# Patient Record
(demographics unavailable — no encounter records)

---

## 2024-11-23 NOTE — P.HP
Certification for Inpatient


Patient admitted to: Observation


With expected LOS: <2 Midnights


Patient will require the following post-hospital care: None


Practitioner: I am a practitioner with admitting privileges, knowledge of 

patient current condition, hospital course, and medical plan of care.


Services: Services provided to patient in accordance with Admission requirements

found in Title 42 Section 412.3 of the Code of Federal Regulations





Patient History


Date of Service: 11/24/24


Primary Care Provider: sees Dr. Schrader, Dr. Martinez, Dr. Maynard


History of Present Illness: 





Ms. Martinez is a 78-year-old female with a past medical history of 

hypertension, hyperlipidemia, A-fib on long-term anticoagulation (Eliquis), 

COPD, assuming CKI with KINGA superimposed, hypothyroidism, dementia, 

osteoporosis, and chronic pain.  She presented to the emergency department for 

wheezing and was noted to have acute anemia.  Ms. Martinez has not been seen at 

this facility in about 3 years or so unsure of her kidney function/echo/normal 

H&H.  She gives a history of seeing Dr. Martinez, Dr. Maynard, and Dr. Schrader.  

At bedside she is alert but confused, pale, with multiple areas to her 

extremities with pressure bandages from bleeding bright red blood.  Her  

states that she scratches herself during the evening and he is attempted to 

cover her skin but she awakes with the sheets wet with blood.  She denies 

hematuria, melena, hematochezia, any nausea or vomiting, or hemoptysis.  Vital 

signs at this time are 198/62, 98% on 2 L via nasal cannula, heart rate 51. 


Her medications are consistent with hypertension, A-fib, congestive failure, 

CKD, COPD, dementia, and hypothyroidism.  They have typed and screened and 

ordered 2 units of packed red blood cells in the emergency department. 


Allergies





Sulfa (Sulfonamide Antibiotics) [Sulfa(Sulfonamide Antibiotics)] Allergy 

(Intermediate, Verified 09/21/20 15:05)


   unknown


artificial sweetners Allergy (Uncoded 09/21/20 15:05)


   Anaphylaxis





Home medications list reviewed: Yes


Home Medications: 








Amiodarone HCl [Cordarone*] 200 mg PO BID 04/08/18 


Atorvastatin Calcium [Lipitor] 40 mg PO BEDTIME 04/08/18 


Metoprolol Succinate 25 mg PO BID 09/21/20 


Apixaban [Eliquis] 5 mg PO TID 11/23/24 


Furosemide 40 mg PO DAILY 11/23/24 


Hydralazine HCl 25 mg PO TID 11/23/24 


Levothyroxine Sodium [Unithroid] 37.5 mcg PO DAILY 11/23/24 


Memantine HCl 5 mg PO BID 11/23/24 


Metoprolol Tartrate [Lopressor] 50 mg PO BID 11/23/24 


Potassium Chloride [Klor-Con 10] 10 meq PO DAILY 11/23/24 


Triamterene/Hydrochlorothiazid [Triamterene-Hctz 37.5-25 mg Cp] 1 each PO DAILY 

11/23/24 








- Past Medical/Surgical History


Has patient received pneumonia vaccine in the past: Yes


Diabetic: No


-: SVT


-: chronic pain syndrome


-: dyslipidemia


-: hypothyroidism


-: A-fib


-: Heart failure with unknown EF


-: Dementia


-: Osteoporosis


-: bupivicain pain pump


Psychosocial/ Personal History: Lives at home with her .  Has dementia.  

Denies having home O2.





- Family History


  ** Father


-: Heart disease





  ** Mother


-: Heart disease





  ** Sister


-: Cancer





- Social History


Smoking Status: Former smoker


Alcohol use: No


CD- Drugs: No


Caffeine use: No


Place of Residence: Home





Review of Systems


10-point ROS is otherwise unremarkable


General: Weakness


Eyes: Unremarkable


ENT: Unremarkable


Respiratory: Wheezing


Cardiovascular: Light Headedness


Gastrointestinal: Unremarkable


Genitourinary: Unremarkable


Musculoskeletal: Unremarkable


Integumentary: Unremarkable


Neurological: Other (Lightheaded)


Lymphatics: Unremarkable





Physical Examination





- Physical Exam


General: Alert, Cooperative, Confused, Obese


HEENT: Atraumatic, Normocephalic


Neck: Supple


Respiratory: Crackles/rales, Expiratory wheezes


Cardiovascular: Other (Bradycardic at 51 per telemetry monitor in the room, 

unable to locate EKG from the emergency department)


Capillary refill: <2 Seconds


Gastrointestinal: Normal bowel sounds, Soft and benign


Musculoskeletal: No clubbing, No swelling


Integumentary: Other (Thin skin with multiple areas of pressure bandages to 

extremities)


Neurological: Normal tone, Normal affect, Dementia


Lymphatics: No axilla or inguinal lymphadenopathy


External genitalia: Deferred


Rectal: Deferred





- Studies


Laboratory Data (last 24 hrs)











  11/23/24 11/23/24 11/23/24





  12:00 12:00 12:00


 


WBC    7.60


 


Hgb    7.3 L


 


Hct    24.5 L


 


Plt Count    394


 


PT  28.6 H  


 


INR  2.63  


 


APTT  36.8  


 


Sodium   136 


 


Potassium   4.5 


 


BUN   39 H 


 


Creatinine   2.06 H 


 


Glucose   139 H 


 


Total Bilirubin   1.0 


 


AST   20 


 


ALT   15 


 


Alkaline Phosphatase   98 











Assessment and Plan





- Plan


HF with unknown EF


Continue home medication except at triamterene/HCTZ


Telemetry


Echocardiogram


ACE inhibitor/ARB


Beta-blocker


Cardiology consultation


Diuresis


Strict Is & Os


Repeat chest x-ray


Daily weight


Education regarding diet and treatment of congestive heart failure





History of A-fib with SVR, long-term anticoagulant use, supratherapeutic INR


Hold Eliquis for now, consider TXA, Kcentra, will be rec'ing 2 units PRBCs from 

ED


Monitor for bleeding urine, stool, vaginal, hemoptysis, peripheral


CKD with KINGA, unknown recent creatinine/GFR


Monitor and trend creatinine and GFR


Strict I&O


Acute on chronic anemia, multifactorial including current acute loss and anemia 

of chronic disease secondary to CKD


Consult Dr. Martinez





Hypothyroidism 


Continue current euthyroid





Dementia


Continue memantine


Reorient frequently


Safety measures/ fall precaution





GI prophylaxis


Plan to discharge in: Greater than 2 days





- Advance Directives


Does patient have a Living Will: No


Does patient have a Durable POA for Healthcare: No





- Code Status/Comfort Care


Code Status Assessed: Yes (Full)

## 2024-11-23 NOTE — EDPHYS
Physician Documentation                                                                           

 [facility]                                                                 

Name: Olga Martinez                                                                              

Age: 78 yrs                                                                                       

Sex: Female                                                                                       

: 1946                                                                                   

MRN: [id number]                                                                                   

Arrival Date: 2024                                                                          

Time: 11:18                                                                                       

Account#: [id number]                                                                            

Bed 17                                                                                            

Private MD:                                                                                       

ED Physician Azam Llamas                                                                        

HPI:                                                                                              

                                                                                             

16:06 This 78 yrs old  Female presents to ER via Wheelchair with complaints of High  dr5 

      Blood Pressure.                                                                             

16:06 The patient has elevated blood pressure and discovered this at home. Onset: The         dr5 

      symptoms/episode began/occurred acutely. .                                                  

16:10 Patient is a 78-year-old female with history of chronic pain, hypothyroidism, COPD,     dr5 

      hyperlipidemia, cardiac arrhythmias presenting with right forearm bleeding that will        

      not stop. Patient reports taking Eliquis 5 mg twice a day. Patient went to next of her      

      urgent care and they were unable to stop bleeding and was referred here. Patient denies     

      chest pain shortness of breath. Patient denies any kidney issues..                          

                                                                                                  

Historical:                                                                                       

- Allergies:                                                                                      

11:43 PENICILLINS;                                                                            cm10

11:43 Sulfa (Sulfonamide Antibiotics);                                                        cm10

- Home Meds:                                                                                      

11:43 Eliquis 5 mg Oral tab 2 times per day [Active];                                         cm10

- PMHx:                                                                                           

11:43 cardiiac arrythmia; Chronic pain; Dementia; High Cholesterol; Hypothyroidism; COPD;     cm10

                                                                                                  

- Immunization history:: Adult Immunizations up to date.                                          

- Infectious Disease History:: Denies.                                                            

- Social history:: Smoking status: Patient/guardian denies using tobacco.                         

                                                                                                  

                                                                                                  

ROS:                                                                                              

16:10 Constitutional: as per hpi                                                              dr5 

                                                                                                  

Exam:                                                                                             

16:10 Constitutional:  This is a well developed, well nourished patient who is awake, alert,  dr5 

      and in no acute distress. Head/Face:  Normocephalic, atraumatic. Eyes:  Pupils equal        

      round and reactive to light, extra-ocular motions intact.  Lids and lashes normal.          

      Conjunctiva and sclera are non-icteric and not injected - pale.  Cornea within normal       

      limits.  Periorbital areas with no swelling, redness, or edema. Chest/axilla:  Normal       

      chest wall appearance and motion.  Nontender with no deformity.  No lesions are             

      appreciated. Cardiovascular:  Regular rate and rhythm with a normal S1 and S2. Normal       

      PMI, no JVD. No pulse deficits. Respiratory:  Lungs have equal breath sounds                

      bilaterally, clear to auscultation.  No rales, rhonchi or wheezes noted. No increased       

      work of breathing, no retractions or nasal flaring.                                         

16:10 Skin: injury, abrasion(s), moderate sized abrasion noted, of the dorsal aspect of right     

      forearm, Skin abrasion noted to right forearm.  Bleeding controlled with nonadherent        

      dressing and pressure dressing.,                                                            

16:10 Neuro: Exam negative for acute changes,                                                     

                                                                                                  

Vital Signs:                                                                                      

11:42  / 62; Pulse 64; Resp 18; Temp 98.1(O); Pulse Ox 86% on R/A; Weight 113.4 kg;     cm10

      Height 5 ft. 7 in. ; Pain 5/10;                                                             

12:57  / 63; Pulse 55; Resp 17; Pulse Ox 99% on R/A;                                    rs5 

13:44  / 64; Pulse 53; Resp 16; Pulse Ox 99% on R/A;                                    rs5 

14:58  / 63; Pulse 51; Resp 17; Pulse Ox 99% on R/A;                                    rs5 

15:45  / 66; Pulse 54; Resp 17; Pulse Ox 99% on R/A;                                    rs5 

11:42 Body Mass Index 39.16 (113.40 kg, 170.18 cm)                                            cm10

11:42 Pain Scale: Adult                                                                       cm10

                                                                                                  

MDM:                                                                                              

11:24 Medical Screening Exam initiated                                                        dr5 

16:10 Differential diagnosis: hypertensive crisis. Data reviewed: vital signs, nurses notes,  dr5 

      lab test result(s). Consideration of Admission/Observation Patient was admitted/placed      

      on observation. Historians other than the Patient: Spouse/Significant Other: .       

      Care significantly affected by the following chronic conditions: Diabetes,                  

      Hypertension, Hyperlipidemia. Care significantly affected by the following Social           

      Determinants of Health: Poor access to healthcare and/or lack of insurance, Poor access     

      to transportation. Counseling: I had a detailed discussion with the patient and/or          

      guardian regarding the historical points, exam findings, and any diagnostic results         

      supporting the discharge/admit diagnosis, the presence of at least one elevated blood       

      pressure reading (>120/80) during this emergency department visit, the need for further     

      work-up and treatment in the hospital. ED course: I gave patient blood pressure             

      medications upon her arrival due to dizziness. Labs show acute kidney injury, anemia.       

      Will admit to hospital for blood transfusion and further management.. ED course: 2          

      units of PRBC ordered with Type and Screen. Consent obtained.                               

                                                                                                  

                                                                                             

11:43 Order name: CBC with Diff                                                               dr5 

                                                                                             

11:43 Order name: CMP; Complete Time: 12:29                                                   dr5 

                                                                                             

11:43 Order name: Protime (+inr); Complete Time: 12:22                                        dr5 

                                                                                             

11:43 Order name: Ptt, Activated; Complete Time: 12:22                                        dr5 

                                                                                             

13:35 Order name: Type And Screen                                                             dr5 

                                                                                             

14:53 Order name: UR SODIUM                                                                   EDMS

                                                                                             

14:53 Order name: Urinalysis w/ reflexes                                                      EDMS

                                                                                             

14:53 Order name: Basic Metabolic Panel                                                       EDMS

                                                                                             

14:53 Order name: Basic Metabolic Panel                                                       EDMS

                                                                                             

14:53 Order name: CBC with Automated Diff                                                     EDMS

                                                                                             

14:53 Order name: CBC with Automated Diff                                                     EDMS

                                                                                             

14:53 Order name: Ferritin                                                                    EDMS

                                                                                             

14:53 Order name: Ferritin                                                                    EDMS

                                                                                             

14:53 Order name: Iron                                                                        EDMS

                                                                                             

14:53 Order name: Iron                                                                        EDMS

                                                                                             

14:53 Order name: Protime (+INR)                                                              EDMS

                                                                                             

14:53 Order name: Protime (+INR)                                                              EDMS

                                                                                             

14:53 Order name: PTT, Activated Partial Thromb                                               EDMS

                                                                                             

14:53 Order name: PTT, Activated Partial Thromb                                               EDMS

                                                                                             

14:53 Order name: Transferrin Sat/Iron Binding                                                EDMS

                                                                                             

14:53 Order name: Transferrin Sat/Iron Binding                                                EDMS

                                                                                             

14:56 Order name: CBC Smear Scan                                                              EDMS

                                                                                             

15:41 Order name: ABO/RH no charge; Complete Time: 16:13                                      EDMS

                                                                                                  

Administered Medications:                                                                         

No medications were administered                                                                  

                                                                                                  

                                                                                                  

Disposition Summary:                                                                              

24 14:31                                                                                    

Hospitalization Ordered                                                                           

 Notes:       Hospitalization Status: Inpatient Admission                                           
  dr5

      Provider: Parish Palafox                                                                 dr5 

      Location: Telemetry/MedSurg (Inpatient)                                                 dr5 

      Condition: Stable                                                                       dr5 

      Problem: new                                                                            dr5 

      Symptoms: are unchanged                                                                 dr5 

      Bed/Room Type: Standard                                                                 dr5 

      Room Assignment: 202(24 14:59)                                                    cc6 

      Diagnosis                                                                                   

        - Anemia, unspecified                                                                 dr5 

        - Acute kidney failure, unspecified                                                   dr5 

      Forms:                                                                                      

        - Medication Reconciliation Form                                                      dr5 

        - SBAR form                                                                           dr5 

        - Leadership Thank You Letter                                                         dr5 

Signatures:                                                                                       

Dispatcher [employer]                           Maxine Ambrosioissa, RN                  RN   cm10                                                 

Gayle Cullen                           cc6                                                  

Ulises Hutchins, FNP-C                   FNP-Cdr5                                                  

                                                                                                  

Corrections: (The following items were deleted from the chart)                                    

11 11:44 CBC+H.LAB.BRZ ordered. EDMS                                                       EDMS

 11:44 COMPREHENSIVE METABOLIC PANEL+C.LAB.BRZ ordered. EDMS                             EDMS

: 11:44 PROTIME (+INR)+COAG.LAB.BRZ ordered. EDMS                                         EDMS

 11:44 PTT, ACTIVATED+COAG.LAB.BRZ ordered. EDMS                                         EDMS

13:39 13:39 BB Add On+BB.LAB.BRZ ordered. EDMS                                                EDMS

14:59 14:31 dr5                                                                               cc6 

                                                                                                  

**************************************************************************************************

## 2024-11-23 NOTE — ER
Nurse's Notes                                                                                     

 [facility]                                                                 

Name: Olga Martinez                                                                              

Age: 78 yrs                                                                                       

Sex: Female                                                                                       

: 1946                                                                                   

MRN: [id number]                                                                                   

Arrival Date: 2024                                                                          

Time: 11:18                                                                                       

Account#: [id number]                                                                            

Bed 17                                                                                            

Private MD:                                                                                       

Diagnosis: Anemia, unspecified;Acute kidney failure, unspecified                                  

                                                                                                  

Presentation:                                                                                     

                                                                                             

11:42 Chief complaint: Patient states: Was at urgent care for skin tear to right arm and was  cm10

      sent to the ER for elevated BP. Pt noted to still be bleeding from right arm. Pt taking     

      eliquis. Coronavirus screen: Client denies travel out of the [place] in the last 14 days.      

      Ebola Screen: Patient denies travel to an Ebola-affected area in the 21 days before         

      illness onset. No symptoms or risks identified at this time. Initial Sepsis Screen:         

      Does the patient meet any 2 criteria? No. Patient's initial sepsis screen is negative.      

      Does the patient have a suspected source of infection? No. Patient's initial sepsis         

      screen is negative. Risk Assessment: Do you want to hurt yourself or someone else?          

      Patient reports no desire to harm self or others. Onset of symptoms was 2024.                                                                                       

11:42 Method Of Arrival: Wheelchair                                                           cm10

11:42 Acuity: DIMA 3                                                                           cm10

                                                                                                  

Triage Assessment:                                                                                

11:44 General: Appears in no apparent distress. uncomfortable, Behavior is calm, cooperative. cm10

      Neuro: No deficits noted. Level of Consciousness is awake, alert, Oriented to person,       

      place, time, situation, Appropriate for age. Respiratory: No deficits noted. Airway is      

      patent Respiratory effort is even, unlabored, Respiratory pattern is regular,               

      symmetrical.                                                                                

                                                                                                  

Historical:                                                                                       

- Allergies:                                                                                      

11:43 PENICILLINS;                                                                            cm10

11:43 Sulfa (Sulfonamide Antibiotics);                                                        cm10

- Home Meds:                                                                                      

11:43 Eliquis 5 mg Oral tab 2 times per day [Active];                                         cm10

- PMHx:                                                                                           

11:43 cardiiac arrythmia; Chronic pain; Dementia; High Cholesterol; Hypothyroidism; COPD;     cm10

                                                                                                  

- Immunization history:: Adult Immunizations up to date.                                          

- Infectious Disease History:: Denies.                                                            

- Social history:: Smoking status: Patient/guardian denies using tobacco.                         

                                                                                                  

                                                                                                  

Screenin:30 [facility] [place] ED Fall Risk Assessment (Adult) History of falling in the last 3 months,       rs5 

      including since admission Yes- single mechanical fall (1 pt) Confusion or                   

      Disorientation No (0 pts) Intoxicated or Sedated No (0 pts) Impaired Gait Yes (1 pt)        

      Mobility Assist Device Used Yes (1 pt) Altered Elimination No (0 pt) Score/Fall Risk        

      Level 0 - 2 = Low Risk Oriented to surroundings, Maintained a safe environment. Abuse       

      screen: Denies threats or abuse. Nutritional screening: No deficits noted. Tuberculosis     

      screening: No symptoms or risk factors identified.                                          

                                                                                                  

Assessment:                                                                                       

11:28 General: Appears in no apparent distress. uncomfortable, Behavior is calm, cooperative. rs5 

      Pain: Denies pain. Neuro: Level of Consciousness is awake, alert, obeys commands,           

      Oriented to person, place, time, situation. Cardiovascular: Patient's skin is warm and      

      dry. Respiratory: Airway is patent Respiratory effort is even, unlabored, Respiratory       

      pattern is regular, symmetrical. GI: Abdomen is round non-distended, Abd is soft and        

      non tender X 4 quads. : No signs and/or symptoms were reported regarding the              

      genitourinary system. EENT: No signs and/or symptoms were reported regarding the EENT       

      system. Derm: skin tears noted to right outer forearm, no active bleeding noted,            

      cleansed with normal saline, Neosporin applied, non adherent dressing applied and           

      wrapped with ACE wrap, pt tolerated wound care well.                                        

11:35 Reassessment: provider notified of elevated BP.                                         rs5 

12:35 Reassessment: Patient and/or family updated on plan of care and expected duration. Pain rs5 

      level reassessed. Patient is alert, oriented x 3, equal unlabored respirations, skin        

      warm/dry/pink.                                                                              

13:41 Reassessment: Patient and/or family updated on plan of care and expected duration. Pain rs5 

      level reassessed. Patient is alert, oriented x 3, equal unlabored respirations, skin        

      warm/dry/pink.                                                                              

13:45 Reassessment: provider notified of elevated BP.                                         rs5 

14:58 Reassessment: Patient and/or family updated on plan of care and expected duration. Pain rs5 

      level reassessed. Patient is alert, oriented x 3, equal unlabored respirations, skin        

      warm/dry/pink.                                                                              

15:40 Reassessment: Patient and/or family updated on plan of care and expected duration. Pain rs5 

      level reassessed. Patient is alert, oriented x 3, equal unlabored respirations, skin        

      warm/dry/pink.                                                                              

                                                                                                  

Vital Signs:                                                                                      

11:42  / 62; Pulse 64; Resp 18; Temp 98.1(O); Pulse Ox 86% on R/A; Weight 113.4 kg;     cm10

      Height 5 ft. 7 in. ; Pain 5/10;                                                             

12:57  / 63; Pulse 55; Resp 17; Pulse Ox 99% on R/A;                                    rs5 

13:44  / 64; Pulse 53; Resp 16; Pulse Ox 99% on R/A;                                    rs5 

14:58  / 63; Pulse 51; Resp 17; Pulse Ox 99% on R/A;                                    rs5 

15:45  / 66; Pulse 54; Resp 17; Pulse Ox 99% on R/A;                                    rs5 

11:42 Body Mass Index 39.16 (113.40 kg, 170.18 cm)                                            cm10

11:42 Pain Scale: Adult                                                                       cm10

                                                                                                  

ED Course:                                                                                        

11:22 Patient arrived in ED.                                                                  ra3 

11:23 Ulises Hutchins FNP-C is PHCP.                                                          dr5 

11:23 Azam Llamas MD is Attending Physician.                                               dr5 

11:30 Patient has correct armband on for positive identification. Placed in gown. Bed in low  rs5 

      position. Call light in reach. Side rails up X2.                                            

11:30 No provider procedures requiring assistance completed.                                  rs5 

11:34 Pola Niño, RN is Primary Nurse.                                                     rs5 

11:37 Inserted saline lock: 24 gauge in left antecubital area, using aseptic technique. Blood rs5 

      collected. Flushed with 10 mL NS.                                                           

11:43 Triage completed.                                                                       cm10

11:44 Arm band placed on right wrist. Patient placed in an exam room, on a stretcher.         cm10

14:31 Parish Palafox is Hospitalizing Provider.                                                dr5 

15:03 Inserted saline lock: 22 gauge in left wrist, using aseptic technique. Flushed with 10  cm10

      mL NS.                                                                                      

15:10 Warm blanket given.                                                                     ll1 

15:45 Patient admitted, IV remains in place.                                                  rs5 

                                                                                                  

Administered Medications:                                                                         

No medications were administered                                                                  

                                                                                                  

                                                                                                  

Medication:                                                                                       

12:58 VIS not applicable for this client.                                                     rs5 

                                                                                                  

Outcome:                                                                                          

14:31 Decision to Hospitalize by Provider.                                                    dr5 

15:45 Condition: stable                                                                       rs5 

15:45 Instructed on the need for admit, Demonstrated understanding of instructions,               

15:45 Admitted to Med/surg accompanied by tech,                                               rs5 

15:49 Patient left the ED.                                                                    rs5 

                                                                                                  

Signatures:                                                                                       

Sally Garvey RN                       RN   ll1                                                  

Pola Niño RN                       RN   rs5                                                  

Pina Rosario RN                  RN   cm10                                                 

Marizol Siddiqui                                   ra3                                                  

Ulises Hutchins, FNP-C                   FNP-Cdr5                                                  

                                                                                                  

Corrections: (The following items were deleted from the chart)                                    

18:41 15:45 Discharged to home ambulatory, rs5                                                rs5 

18:41 15:45 Discharge instructions given to patient, family, Instructed on discharge          rs5 

      instructions, follow up and referral plans. Demonstrated understanding of instructions,     

      follow-up care, rs5                                                                         

                                                                                                  

**************************************************************************************************

## 2024-11-24 NOTE — P.PN
Date of Service: 11/24/24





Subjective


Alert but confused, demented, hard of hearing.  Ms. Martinez was transferred to 

the ICU overnight secondary to uncontrolled hypertension.  Cardene drip 

initiated.  Patient hemodynamically stable now (/53), HH 56.  Neb 

treatments continue. 





Review of systems





10-point ROS is otherwise unremarkable


General: Weakness


Eyes: Unremarkable


ENT: Unremarkable


Respiratory: Wheezing


Cardiovascular: Light Headedness


Gastrointestinal: Unremarkable


Genitourinary: Unremarkable


Musculoskeletal: Unremarkable


Integumentary: Unremarkable


Neurological: Other (Lightheaded)


Lymphatics: Unremarkable





 Physical Examination 





- Physical Exam


General: Alert, Cooperative, Confused, Obese


HEENT: Atraumatic, Normocephalic


Neck: Supple


Respiratory: Crackles/rales, Expiratory wheezes


Cardiovascular: Other, irregular


Capillary refill: <2 Seconds


Gastrointestinal: Normal bowel sounds, Soft and benign


Musculoskeletal: No clubbing, No swelling


Integumentary: Other (Thin skin with multiple areas of pressure bandages to 

extremities)


Neurological: Normal tone, Normal affect, Dementia


Lymphatics: No axilla or inguinal lymphadenopathy


External genitalia: Deferred


Rectal: Deferred





- Studies


Laboratory Data (last 24 hrs)











  11/23/24 11/23/24 11/23/24





  12:00 12:00 12:00


 


WBC    7.60


 


Hgb    7.3 L


 


Hct    24.5 L


 


Plt Count    394


 


PT  28.6 H  


 


INR  2.63  


 


APTT  36.8  


 


Sodium   136 


 


Potassium   4.5 


 


BUN   39 H 


 


Creatinine   2.06 H 


 


Glucose   139 H 


 


Total Bilirubin   1.0 


 


AST   20 


 


ALT   15 


 


Alkaline Phosphatase   98 











 Assessment and Plan 





- Plan


HF with unknown EF


Continue home medication except at triamterene/HCTZ


Telemetry


Echocardiogram


ACE inhibitor/ARB


Beta-blocker


Cardiology consultation


Diuresis


Strict Is & Os


Repeat chest x-ray


Daily weight


Education regarding diet and treatment of congestive heart failure





History of A-fib with SVR, long-term anticoagulant use, supratherapeutic INR


Hold Eliquis for now


Monitor for bleeding urine, stool, vaginal, hemoptysis, peripheral - INR 

improved


Consider Kcentra 


CKD with KINGA, unknown recent creatinine/GFR - creatinine and GFR decreased to 

2.41 and 20 respectively


Dr. Martinez consulted


Monitor and trend creatinine and GFR


Strict I&O


Acute on chronic anemia, multifactorial including current acute loss, RADHA, and 

anemia of chronic disease secondary to CKD


Consult Dr. Martinez





Hypothyroidism 


Continue current euthyroid





Dementia


Continue memantine


Reorient frequently


Safety measures/ fall precautions





Iron deficiency anemia


IV iron initiated





Plan to discharge in: Greater than 2 days





- Advance Directives


Does patient have a Living Will: No


Does patient have a Durable POA for Healthcare: No





- Code Status/Comfort Care


Code Status Assessed: Yes (FullRev














<Ann Blanton - Last Filed: 11/24/24 09:42>


Hemoglobin up to 8.4 after 1 unit packed cell transfusion, no thrombocytopenia, 

INR down to less than 2, no active bleeding but continues oozing from 

superficial excoriation on the right upper arm, I will give 5 mg IV vitamin K x 

1 as there is no oral vitamin K is available.  Will continue to hold her 

apixaban, monitor H&H and INR.








<ANNE MARIE Palafox - Last Filed: 11/24/24 14:31>

## 2024-11-24 NOTE — RAD REPORT
EXAMINATION: US RENAL ULTRASOUND



CLINICAL INDICATION: marshall



TECHNIQUE: Real-time ultrasonography of the abdomen was performed. 



COMPARISON: 10/9/2023



FINDINGS: 



RIGHT KIDNEY: Right renal length measurement: 7.3 x 5.2 x 4.2 cm. Normal in echogenicity and size. No
 calculus, solid mass or hydronephrosis.  2 cm right peripelvic parapelvic cyst.



LEFT KIDNEY: Left renal length measurement: 7.3 x 4.5 x 4.5 cm. Normal in echogenicity and size. No c
alculus, solid mass or hydronephrosis.



URINARY BLADDER: Incompletely distended without gross abnormality detected.



ADDITIONAL FINDINGS: None.



IMPRESSION:  



Unremarkable renal ultrasound.



 2 cm benign right parapelvic cyst.



Reported By: Francesco Perez 

Electronically Signed:  11/24/2024 10:12 PM

## 2024-11-24 NOTE — P.PN
Subjective


Date of Service: 11/24/24


Primary Care Provider: sees Dr. Schrader, Dr. Martinez, Dr. Maynard


Subjective: No new changes





Physical Examination





- Vital Signs


Temperature: 98.9 F


Blood Pressure: 155/53


Pulse: 65


Respirations: 13


Pulse Ox (%): 95





- Studies


Laboratory Data (last 24 hrs)











  11/23/24 11/23/24 11/23/24





  12:00 12:00 12:00


 


WBC    7.60


 


Hgb    7.3 L


 


Hct    24.5 L


 


Plt Count    394


 


PT  28.6 H  


 


INR  2.63  


 


APTT  36.8  


 


Sodium   136 


 


Potassium   4.5 


 


BUN   39 H 


 


Creatinine   2.06 H 


 


Glucose   139 H 


 


Total Bilirubin   1.0 


 


AST   20 


 


ALT   15 


 


Alkaline Phosphatase   98 











Assessment And Plan





- Plan


HF with unknown EF


Continue home medication except at triamterene/HCTZ


Telemetry


Echocardiogram


ACE inhibitor/ARB


Beta-blocker


Cardiology consultation


Diuresis


Strict Is & Os


Repeat chest x-ray


Daily weight


Education regarding diet and treatment of congestive heart failure





History of A-fib with SVR, long-term anticoagulant use, supratherapeutic INR


Hold Eliquis for now


Monitor for bleeding urine, stool, vaginal, hemoptysis, peripheral


Consider Kcentra 


CKD with KINGA, unknown recent creatinine/GFR


Monitor and trend creatinine and GFR


Strict I&O


Acute on chronic anemia, multifactorial including current acute loss and anemia 

of chronic disease secondary to CKD


Consult Dr. Martinez





Hypothyroidism 


Continue current euthyroid





Dementia


Continue memantine


Reorient frequently


Safety measures/ fall precaution





GI prophylaxis

## 2024-11-25 NOTE — P.PN
Date of Service: 12/01/24





 Subjective 


Patient doing better.  Patient more awake and alert.  Patient remains with 

generalized weakness.  Continue with conservative management.  Hemodialyzed 

yesterday.  Plan to dialyze on Monday.  Continue monitoring volume status 

closely.  Strict I's and O's.  Recheck renal function in the morning.  Spoke 

with  who is at bedside.








 Physical Examination 





-Vitals


Reviewed





-Physical Exam


General: Alert, Cooperative, Confused, demented, obese


Respiratory: Crackles/rales, Expiratory wheezes


Cardiovascular: irregular; irregularly rate and rhythm


Gastrointestinal: Normal bowel sounds, Soft and benign


Musculoskeletal: No clubbing, No swelling


Integumentary: Other (Thin skin with multiple areas of pressure bandages to 

extremities)


Neurological: Normal tone, Normal affect, diffuse weakness; dementia








 Assessment and Plan 





-Assessment/Plan


1.  Acute on chronic kidney disease with progression to end-stage renal disease.

 Patient remains with minimal urine output.  Hemodialysis per nephrology.  

Strict I's and O's.  May need to arrange for outpatient hemodialysis.  

Appreciate nephrology input.


2.  HFpEF with elevated right atrial pressures; continue with monitoring volume 

status.  Continue with cardiac meds as well.  


3.  History of A-fib with SVR, long-term anticoagulant use, supratherapeutic 

INR; Continue with anticoagulation and medication for rate control


4.  Hypothyroidism; continue with Synthroid


5.  Dementia with generalized weakness; continue with medication for 

Alzheimer's.  Continue with physical therapy as patient continues to improve.


6.  Iron deficiency anemia; hemoglobin is stable.  Continue with iron 

supplementation.


7.  Upper respiratory infection; continue with antibiotic therapy and continue 

with medication for upper airway congestion including neb treatments and 

steroids.





- Advance Directives


Does patient have a Living Will: No


Does patient have a Durable POA for Healthcare: No





- Code Status/Comfort Care


Code Status Assessed: Yes (Full)

## 2024-11-25 NOTE — RAD REPORT
Procedure: Chest Single View



HISTORY: CHF



COMPARISON: 2020



FINDINGS:



Large left pleural effusion with basilar atelectasis.



Small right pleural effusion.



Mild interstitial lung opacities.



Cardiomegaly



IMPRESSION:



Large left pleural effusion



Mild interstitial pulmonary edema



Reported By: Dylan Reyna 

Electronically Signed:  11/25/2024 3:41 PM

## 2024-11-25 NOTE — CON
Date of Consultation:  11/24/2024



Chief Complaint:  Acute kidney injury, cardiorenal syndrome, congestive heart 
failure exacerbation.



History Of Present Illness:  The patient is admitted to ICU.  The patient is a 
78-year-old woman with past medical history of hypertension, dementia, 
hyperlipidemia, atrial fibrillation, on long-term anticoagulation with Eliquis, 
COPD, hypothyroidism, dementia, osteoporosis, chronic pain.  The patient 
presented to the Emergency Department because of wheezing and she was found to 
have acute anemia.  The patient has history of chronic kidney disease, 
hyponatremia, cardiorenal syndrome.  Previously, she was seen by Dr. Martinez 
for chronic kidney disease and hyponatremia.  She has been followed by Dr. Maynard.  The patient remains confused.  She denies complaints.  She cannot 
provide review of systems due to dementia.  The patient has decreased urine 
output and Nephrology consultation is requested for acute kidney injury.  The 
patient did not have hematuria, melena, hematochezia.  Denies nausea, vomiting. 
Vital signs obtain in the ER showed blood pressure 98/62, SpO2 98 on 2 L nasal 
cannula, heart rate is 61.



Review of Systems:

Unobtainable.  The patient has dementia.  She denies pain.  Denies shortness of 
breath.



Past Medical History:  Chronic pain syndrome, dyslipidemia, hypothyroidism, 
atrial fibrillation, heart failure with unknown ejection fraction, dementia, 
osteoporosis, SVT.



Family History:  Father, heart disease.  Mother, heart disease.  Sister, cancer.



Social History:  Former smoker.  Denies illicit drugs.  Denies caffeine. 







Physical Examination:

General:  The patient is awake, alert, follows commands. 

Eyes:  Anicteric, sclerae.  EOMI. 

Ears, Nose, Mouth no discharge , oral mucosa moist. 

Extremities:  The patient has some edema in the lower extremities but there is 
no clubbing, no cyanosis.



Laboratory Data:  Blood work:  WBC 7.6, hemoglobin 7.3, platelet count 394.  
Sodium 136, potassium 4.5, BUN 39, creatinine 2.06, glucose 149, AST 20, ALT 16,
AP 98.



Impression And Plan:  

1.   The patient is admitted to ICU for congestive heart failure exacerbation.  
Continue home medication, although triamterene and HCTZ was stopped due to risk 
of hyperkalemia and hyponatremia.  The patient is consulted by Cardiology and 
echocardiogram is pending.  The patient is on beta blocker and she will benefit 
from Ace inhibitor due to congestive heart failure.  Urine output is declining. 
Plan is to use the diuretics to increase diuresis.  Chest x-ray will be done in 
the morning to re-evaluate.

2.   History of atrial fibrillation with rapid ventricular rate.  Long-term 
anticoagulation use.

3.   Acute kidney injury, is borderline oliguric.  Plan is to use diuretics to 
induce diuresis.  Check renal ultrasound to rule out hydronephrosis.  The 
patient has cardiorenal syndrome.  Recommend to use diuretic and re-evaluate 
renal function.

4.   Dementia.  Continue current medication.  Management per primary team.

5.   Hypothyroidism.  Continue medication.

6.   Acute on chronic anemia.  Packed red blood cell transfusion according to H 
and H.





PATI/LISA

DD:  11/24/2024 20:08:57   Voice ID:  [id number]

DT:  11/25/2024 01:23:59   Report ID:  [id number]

MTDD

## 2024-11-26 NOTE — PN
Date of Progress Note:  11/26/2024



Subjective:  The patient was admitted with acute kidney injury, hyponatremia, 
and cardiorenal syndrome.  The patient been over-volume.



Physical Examination:

Vital Signs:  When I saw the patient, blood pressure 164/50, pulse of 60. 

Chest:  Crackles bilateral with decreased air entry on the left side. 

Heart:  S1, S2, systolic murmur. 

Abdomen:  Soft, nontender. 

Extremities:  +1 edema. 

Neuro:  Alert, no focality.



Lab:  WBC 7.4, hemoglobin 7.2.  Sodium 134, potassium 4.4, bicarb 23, BUN 51, 
creatinine 3.3, trending up, GFR 14.  Calcium 8.1, phosphorus 3.6, magnesium 
2.5.  Albumin 2.9.  Corrected calcium is 8.9.



Current Medications:  The patient on include:

1.   IV iron.

2.   Amiodarone.

3.   Hydralazine 25 t.i.d.

4.   Nicardipine.

5.   Tylenol.

6.   Lasix 40 b.i.d.



Assessment And Plan:  

1.   Acute kidney injury secondary to cardiorenal over-volume.  The patient 
echocardiogram, diastolic dysfunction with increased filling pressure.  I am 
going to go ahead and continue on the Lasix.  We will monitor the patient.  If 
kidney function continues to decline, the patient may need to be initiated on 
renal replacement therapy.  We will follow up.

2.   Hyponatremia, dilutional, resolved.  We will continue diuresis.

3.   Iron deficiency anemia.  The patient was started on IV iron.  I am going to
go ahead and give one dose of Epogen.  Given the cardiac presentation, we will 
arrange for blood transfusion.

4.   Congestive heart failure with exacerbation.  Consider evaluation by 
Cardiology.  We will follow up.

5.   Pleural effusion, more on the left side.  We will go ahead and get lateral 
chest x-ray.  Consider pulmonary evaluation.



Time spent examining the patient face-to-face reviewing data lab and radiology 
placing orders or discussing the case with the patient discussing the case with 
the team member including hospitalist and nursing staff more than 55-minute

MA/LISA

DD:  11/26/2024 12:41:30   Voice ID:  [id number]

DT:  11/26/2024 13:15:41   Report ID:  [id number]

VANESSA

## 2024-11-26 NOTE — ECHO
HEIGHT: 5 ft 7 in   WEIGHT: 250 lb 0.067 oz   DATE OF STUDY: 11/26/2024   REFER DR: Beverly Orta MD

2-DIMENSIONAL: YES

     M.MODE: YES

 DOPPLER: YES

COLOR FLOW: YES



                    TDS:  NO

PORTABLE: YES

 DEFINITY:  NO

BUBBLE STUDY: NO





DIAGNOSIS:  CONGESTIVE HEART FAILURE



CARDIAC HISTORY:  

CATHERIZATION: 

SURGERY: 

PROSTHETIC VALVE: 

PACEMAKER: 





MEASUREMENTS (cm)

    DIASTOLIC (NORMALS)                 SYSTOLIC (NORMALS)

IVSd                 1.2 (0.6-1.2)                    LA Diam 4.7 (1.9-4.0)     LVEF       
  55-60%  

LVIDd               4.5 (3.5-5.7)                        LVIDs      3.2 (2.0-3.5)     %FS  
        29%

LVPWd             1.2 (0.6-1.2)

Ao Diam           2.9 (2.0-3.7)



2 DIMENSIONAL ASSESSMENT:

RIGHT ATRIUM:                   NORMAL

LEFT ATRIUM:       NORMAL



RIGHT VENTRICLE:            NORMAL

LEFT VENTRICLE: NORMAL



TRICUSPID VALVE:             MILD TRICUSPID REGURGITATION

MITRAL VALVE:     MILD MITRAL REGURGIATION



PULMONIC VALVE:             MILD PULMONARY REGURGITATION

AORTIC VALVE:     NORMAL



PERICARDIAL EFFUSION: NONE

AORTIC ROOT:      NORMAL





LEFT VENTRICULAR WALL MOTION:     NORMAL.



DOPPLER/COLOR FLOW:     GRADE I DIASTOLIC DYSFUNCTION.



COMMENTS:      

  1. NORMAL LEFT VENTRICULAR SYSTOLIC FUNCTION. LEFT VENTRICULAR EJECTION FRACTION 55-60%. 
NORMAL WALL MOTION.

  2. GRADE I DIASTOLIC DYSFUNCTION.

  3. MILDLY ELEVATED FILLING PRESSURE. RIGHT ATRIAL PRESSURE 10-15 mmHg.



TECHNOLOGIST:   SELINA KIRBY

## 2024-11-26 NOTE — PN
Date of Progress Note:  11/25/2024



Chief Complaint:  Acute kidney injury, cardiorenal syndrome, congestive heart 
failure exacerbation.



Subjective:  The patient is admitted to ICU.  The patient is a 78-year-old woman
with past medical history of hypertension; dementia; hyperlipidemia; atrial 
fibrillation, on long-term anticoagulation with Eliquis; COPD; hypothyroidism; 
osteoporosis; chronic pain.  The patient presented to emergency room because of 
wheezing, shortness of breath.  She was found to have acute anemia.  She 
received blood transfusion and patient has history of chronic kidney disease, 
hyponatremia, and cardiorenal syndrome.  The patient remains in ICU.  She is 
started on Lasix.  Urine output is somewhat improving and renal function is 
plateauing.



Review of Systems:

Denies chest pain, palpitation. 



PE NAD 

CV S1, S2 

Lungs few crackles at bases

Abdomen , soft 

extremity slight edema



A/P  The patient is a 78-year-old woman with past medical history of 
hypertension; dementia; hyperlipidemia; atrial fibrillation, on long-term 
anticoagulation with Eliquis; COPD; hypothyroidism; osteoporosis; chronic pain



She was found to have acute anemia.  She received blood transfusion and patient 
has history of chronic kidney disease, hyponatremia, and cardiorenal syndrome.  
The patient remains in ICU.  She is started on Lasix.  Urine output is somewhat 
improving and renal function is plateauing.

Monitor renal panel , avoid nephrotoxic medications, monitor fluid balance and 
urine output. 

Acute anemia, PRBC as needed, monitor h/h.

Hypertenion , avoid ACEI, avoid ARB.















EB/MODL

DD:  11/25/2024 21:27:37   Voice ID:  [id number]

DT:  11/26/2024 01:22:04   Report ID:  [id number]

VANESSA

## 2024-11-26 NOTE — RAD REPORT
Procedure: Chest Single View



HISTORY: Chest pain 



COMPARISON: November 25, 2024



FINDINGS:



Progression in diffuse bilateral opacities



Cardiomegaly



Large left and small right pleural effusions



IMPRESSION:



Progression in moderate bilateral pulmonary opacities probably pulmonary edema.



Large left pleural effusion



Reported By: Dylan Reyna 

Electronically Signed:  11/26/2024 1:24 PM

## 2024-11-27 NOTE — P.CNS
Date of Consult: 11/27/24


Primary Care Provider: sees Dr. Schrader, Dr. Martinez, Dr. Maynard


Chief Complaint: SOB


History of Present Illness: 





Patient with PMH of CHF, AF, HTN, CKD presented with worsening SOB, lower 

extremities swelling, weakness. denies chest pain, no palpitations, no syncope.


Allergies





Sulfa (Sulfonamide Antibiotics) [Sulfa(Sulfonamide Antibiotics)] Allergy 

(Intermediate, Verified 09/21/20 15:05)


   unknown


artificial sweetners Allergy (Uncoded 09/21/20 15:05)


   Anaphylaxis





Home medications list reviewed: Yes


Home Medications: 








Amiodarone HCl [Cordarone*] 200 mg PO BID 04/08/18 


Atorvastatin Calcium [Lipitor] 40 mg PO BEDTIME 04/08/18 


Metoprolol Succinate 25 mg PO BID 09/21/20 


Apixaban [Eliquis] 5 mg PO TID 11/23/24 


Furosemide 40 mg PO DAILY 11/23/24 


Hydralazine HCl 25 mg PO TID 11/23/24 


Levothyroxine Sodium [Unithroid] 37.5 mcg PO DAILY 11/23/24 


Memantine HCl 5 mg PO BID 11/23/24 


Metoprolol Tartrate [Lopressor] 50 mg PO BID 11/23/24 


Potassium Chloride [Klor-Con 10] 10 meq PO DAILY 11/23/24 


Triamterene/Hydrochlorothiazid [Triamterene-Hctz 37.5-25 mg Cp] 1 each PO DAILY 

11/23/24 








- Past Medical/Surgical History


Diabetic: No


-: SVT


-: chronic pain syndrome


-: dyslipidemia


-: hypothyroidism


-: A-fib


-: Heart failure with unknown EF


-: Dementia


-: Osteoporosis


-: bupivicain pain pump


Psychosocial/ Personal History: Lives at home with her .  Has dementia.  

Denies having home O2.





- Family History


  ** Father


Medical History: Heart disease





  ** Mother


Medical History: Heart disease





  ** Sister


Medical History: Cancer





- Social History


Smoking Status: Current every day smoker


Alcohol use: No


CD- Drugs: No


Caffeine use: No


Place of Residence: Home





Review of Systems


10-point ROS is otherwise unremarkable





Physical Examination














Temp Pulse Resp BP Pulse Ox


 


 97.5 F   54   15   155/88 H  96 


 


 11/27/24 07:00  11/27/24 10:00  11/27/24 10:00  11/27/24 10:00  11/27/24 10:00








General: Alert, In no apparent distress


HEENT: Atraumatic, PERRLA, Mucous membr. moist/pink, EOMI, Sclerae nonicteric


Neck: Supple, 2+ carotid pulse no bruit, No LAD, Without JVD or thyroid 

abnormality


Respiratory: Clear to auscultation bilaterally, Normal air movement


Cardiovascular: Regular rate/rhythm, Normal S1 S2


Gastrointestinal: Normal bowel sounds, No tenderness


Musculoskeletal: No tenderness


Integumentary: No rashes


Neurological: Normal gait, Normal speech, Normal tone, Normal affect


Lymphatics: No axilla or inguinal lymphadenopathy





- Problems


(1) Acute on chronic diastolic heart failure


Current Visit: Yes   Status: Acute   


Plan: 


Patient Echo shows normal EF with Grade 1 DD and mild elevated filling pressure.




Patient did not respond well to IV diuresis with poor urine output and worsening

kidney function, Nephrology team is planing to initiate dialysis.








(2) Atrial fibrillation


Current Visit: No   Status: Chronic   


Plan: 


Patient tele shows sinus bradycardia


Hold Amiodarone and Metoprolol


resume Eliquis 2.5 mg po BID once central line is in.


continue to monitor on tele


Qualifiers: 


   Atrial fibrillation type: chronic 





(3) Hypertension


Current Visit: No   Status: Chronic   


Plan: 


add Hydralazine 25 mg po TID


Continue Nicardipine drip.


may add Norvasc 10 mg daily if needed.





Qualifiers: 


   Hypertension type: essential hypertension

## 2024-11-27 NOTE — P.CNS
Date of Consult: 11/27/24


Reason for consult: Needs urgent dialysis                                       

                   





History of present illness: Patient is a 78-year-old female presenting with 

wheezing and anemia and has chronic renal disease which has progressed to the 

point where the patient is an uric and requires dialysis.  Patient is awake and 

alert at this time.  Patient denies any fever, chills, cough at this time.  We 

were consulted by the renal service for placement of a catheter for dialysis.





Review of systems: Otherwise unremarkable





Past medical history: SVT, chronic pain syndrome, dyslipidemia, hypothyroidism, 

A-fib, history of heart failure, dementia 





Past surgical history: Pain pump





Allergies: Sulfa





Social history: Patient used to smoke in the past does not currently does not 

use alcohol 





Family history:  Heart disease and unknown type of cancer in the sister





Vital signs: Stable, afebrile





Physical exam:





Awake and alert


Head and neck exam: No masses


Chest: Clear


Heart: S1 S2


Abdomen: Soft


Extremity: Neurovascular intact


Neuro: Nonfocal





Diagnostic data: Uremia with elevated creatinine and anemia with decreased H&H





Assessment: Acute renal failure in a patient with multiple medical problems





Plan/recommendation: Case discussed with Dr. Martinez.  We will proceed with a 

tunneled dialysis catheter.  Patient and  understand risk, benefits and 

alternatives and agreed to procedure.





CC:

## 2024-11-27 NOTE — PN
Date of Progress Note:  11/27/2024



Subjective:  The patient was admitted to the hospital with cardiorenal syndrome.
 The patient had acute kidney injury, anuric.  The patient had pleural effusion.
 On physical exam, blood pressure 169/55, pulse of 48, afebrile.  The patient 
developed bradycardia, seen by Cardiology.  The patient had chronic bradycardia,
seen by her cardiologist before.  Beta-blocker has been discontinued, placed on 
hydralazine.



Physical Examination:

Vital Signs:  Blood pressure 169/55, pulse of 48. 

Chest:  Decreased entry on the left base.  Crackles on the right base. 

Heart:  S1, S2, systolic murmur. 

Abdomen:  Soft, nontender. 

Extremities:  +1 edema. 

Neurologic:  Alert.  No focality.



Lab:  WBC 7.3, hemoglobin 6.7, platelet 332.  Sodium 133, potassium 4.2, bicarb 
23, BUN 57, creatinine 3.6, trending up.  GFR 12.  Calcium 7.4, phosphorus 3.7. 
Uric acid of 8.  Albumin of 2.7.  Corrected calcium is 8.6.  Serum protein 
electrophoresis still pending.  .  TSH 2.7.  PC ratio 0.6.



Current Medications:  The patient on include hydralazine 25 t.i.d., amiodarone 
200 b.i.d., atorvastatin, metoprolol 25 b.i.d., nicardipine, nitroglycerin 
patch, Lasix drip, levothyroxine, hydromorphone.



Assessment And Plan:  

1.   Acute kidney injury secondary to cardiorenal, poor perfusion, ATN, 
superimposed with hydrochlorothiazide, triamterene, complicated with over-volume
and hyponatremia, oliguric.

2.   I had long discussion with the patient and the  regarding the need 
to initiate renal replacement therapy.  The patient and  on agreement.  
We will proceed with tunneled hemodialysis catheter and will follow up the 
response.

3.   Hypertension, not controlled, with presence of bradycardia, metoprolol has 
been on hold, started on hydralazine and we will follow up.  Continue diuresis 
to establish better volume control.

4.   Anemia of iron deficiency anemia.  I am going to continue IV iron.  
Continue SUJATHA.  Follow up serum protein electrophoresis.

5.   Hyponatremia, secondary to hydrochlorothiazide, superimposed with renal 
failure, dilutional, trending up.  Will be corrected completely with dialysis.

6.   Secondary hyperparathyroidism.  I am going to start the patient on 
calcitriol.

7.   Cardiorenal syndrome with acute kidney injury secondary to cardiorenal as 
above, will follow up with Cardiology.

8.   Pleural effusion on the left side.  Will consider CT with contrast after 
starting dialysis and we will follow up.



Time spent examining the patient face-to-face reviewing data lab and radiology 
placing orders or discussing the case with the patient discussing the case with 
the team member including hospitalist and nursing staff more than 55-minute

MILLIE

DD:  11/27/2024 10:29:30   Voice ID:  [id number]

DT:  11/27/2024 12:44:36   Report ID:  [id number]

MTDD

## 2024-11-27 NOTE — P.OP
Date of Service: 11/27/24


Preop diagnosis: Acute renal failure





Postop diagnosis: Same





Procedure performed: Placement of a tunneled dialysis catheter, interpretation 

of fluoroscopy and ultrasound





Surgeon: Tomás Ocampo MD





Assistant: Shantal ABBOTT





Estimated blood loss: Minimal





Specimen: None





Findings: Normal anatomy





Anesthesia: General





Complications: None





Drains: None





Fluids and blood products: Nonapplicable





Disposition: Recovery room





Operative note: Patient brought to the OR and placed in the supine position.  

General anesthesia began.  Patient prepped and draped in usual sterile fashion. 

Ultrasound used to isolate the right internal jugular vein.  18-gauge needle 

used to access the right internal jugular vein.  Guidewire passed.  Position 

confirmed with fluoroscopy.  Lidocaine 1% infiltrated locally.  Counter incision

made on the right anterior chest.  Tunneling device used to tunnel the catheter 

between the 2 wounds.  Seldinger technique used.  Tip of the catheter placed in 

the right atrial SVC junction.  Position confirmed with fluoroscopy.  Catheter 

flushed with heparin and packed with heparin with good blood flow.  3-0 chromic 

used to close skin.  3-0 nylon used to secure the catheter to the chest wall.  

Sterile dressing applied.  Patient awakened and taken to recovery room in good 

general condition.  Chest x-ray has been ordered.





CC:

## 2024-11-27 NOTE — RAD REPORT
Procedure: Chest Single View



HISTORY: Central venous catheter placement





FINDINGS:



Central venous catheter has been placed in the SVC.



No pneumothorax



Reported By: Dylan Reyna 

Electronically Signed:  11/27/2024 1:42 PM

## 2024-11-28 NOTE — PN
Date of Progress Note:  11/28/2024



Subjective:  The patient was admitted with acute kidney injury secondary to 
cardiorenal.  The patient has been anuric.  The patient was started on Lasix and
Lasix drip, did not respond.  The patient status post TDC yesterday, seen on 
dialysis today, first dialysis today.



Physical Examination:

Vital Signs:  Blood pressure 172/55, pulse of 58, afebrile. 

Chest:  Crackles bilateral.  Decreased air entry on the left side with dullness.


Heart:  S1, S2, systolic murmur. 

Abdomen:  Soft, nontender. 

Extremities:  Trace edema. 

Neurologic:  Alert.  No focality.



Laboratory Data:  WBC 7.3, hemoglobin 6.6.  Sodium 130, potassium 4.5, bicarb 
20, BUN 70, creatinine 4.5, GFR of 9.  Calcium 7.1, phosphorus 3.6, magnesium 
2.3.  Albumin 2.6.  Corrected calcium is 8.3.



Current Medications:  The patient on include:

1.   Amiodarone.

2.   Metoprolol 25 b.i.d.

3.   Nitroglycerin.

4.   Levothyroxine. 



Lab data as above.



Assessment And Plan:  

1.   Acute kidney injury secondary to cardiorenal, anuric, with uremia.  Today 
first session of dialysis.  We will do another session of dialysis tomorrow and 
we will follow up the patient.  Serology still pending.  We will follow up.

2.   Hypertension, controlled, not optimal.  I am going to follow up her blood 
pressure after dialysis today.  We are aiming to remove 2.5 L.  We will follow 
up.

3.   Hyponatremia, dilutional, secondary to renal failure/cardiorenal syndrome. 
Will be corrected with dialysis.

4.   Secondary hyperpara, continue calcitriol.

5.   Iron deficiency anemia secondary to possible loss after the procedure 
yesterday.  Continue IV iron.  The patient is scheduled for 2 units of RBC today
with dialysis.

6.   Pleural effusion on the left side.  I am going to go ahead and get CT with 
contrast tomorrow before dialysis and we will follow up the patient.

7.   Atrial fibrillation.  Follow up with Cardiology.  Continue amiodarone.

8.   Bradycardia.  Beta blocker has been decreased.



Time spent examining the patient face-to-face reviewing data lab and radiology 
placing orders or discussing the case with the patient discussing the case with 
the team member including hospitalist and nursing staff more than 55-minute

MA/MODL

DD:  11/28/2024 11:25:37   Voice ID:  [id number]

DT:  11/28/2024 11:54:48   Report ID:  [id number]

VANESSA

## 2024-11-29 NOTE — PN
Date of Progress Note:  11/29/2024



Subjective:  The patient is complaining of shortness of breath.  Even after 
removing 2 L with dialysis, chest x-ray showed infiltrate with significant left 
pleural effusion.  We will resume diuretic and order dialysis for tomorrow 
again.



Physical Examination:

Vital Signs:  Temperature 97.2, pulse rate 74, blood pressure 181/71. 

General:  Awake and alert, in mild distress on oxygen. 

Neck:  Supple.  No elevated JVD. 

Heart:  Regular rate and rhythm.  Normal S1, S2. 

Chest:  Diffuse rales. 

Abdomen:  Soft and nontender. 

Extremities:  No edema.



Laboratory Data:  White count 7.3, hemoglobin 9.3.  Sodium 135, BUN 45, 
creatinine 3.4.



Assessment And Plan:  

1.   Acute on chronic kidney disease.  The patient started on dialysis for fluid
management and worsening renal function.  I will continue dialysis today and 
tomorrow, then Monday.  Renal dose medication.  Avoid NSAID and contrast.

2.   Atrial fibrillation, currently rate controlled.  She is  on metoprolol 
only.  I will add amlodipine. 

3.   Anemia of chronic disease and iron-deficiency anemia.  We will start on 
Epogen and IV iron.

4.   CHF : cont dialysis , pt with large pleural effusion , might benefit from 
thoracentesis 



Thanks for allowing me to participate in the patient's care.  Total time spent 
55 minutes including documentation, reviewing labs  









SHEY/LISA

DD:  11/29/2024 13:52:23   Voice ID:  [id number]

DT:  11/29/2024 14:17:29   Report ID:  [id number]

VANESSA

## 2024-11-29 NOTE — RAD REPORT
Procedure: Chest Single View



HISTORY: Tachypnea



COMPARISON: November 27, 2024



FINDINGS:



Large left and small right pleural effusions



Worsening in diffuse bilateral opacities probably pulmonary edema



Cardiomegaly. Central venous catheter in place



Reported By: Dylan Reyna 

Electronically Signed:  11/29/2024 12:09 PM

## 2024-12-02 NOTE — P.PN
Date of Service: 11/28/24





 Subjective 


Patient continuing with hemodialysis.  Patient had about 1000 cc removed.  

Patient remains weak and confused.  Continue with hemodialysis per nephrology.  

Monitor volume status closely.  Echo reviewed.





 Physical Examination 





-Vitals


Reviewed





-Physical Exam


General: Alert, Cooperative, Confused, demented, obese; upper airway congestion


Respiratory: Crackles/rales, Expiratory wheezes


Cardiovascular: irregular; irregularly rate and rhythm; occasional bradycardia


Gastrointestinal: Normal bowel sounds, Soft and benign


Musculoskeletal: No clubbing, No swelling


Integumentary: Other (Thin skin with multiple areas of pressure bandages to 

extremities)


Neurological: Normal tone, Normal affect, diffuse weakness; dementia








 Assessment and Plan 





-Assessment/Plan


1.  Acute on chronic kidney disease with progression to end-stage renal disease.

 Patient remains with minimal urine output.  Hemodialysis started with minimal 

fluid removed.  Patient had 1000 cc removed.  Continue with strict I's and O's. 

Monitoring volume status closely.  Social service consultation for outpatient 

hemodialysis.  Appreciate nephrology input.


2.  HFpEF with elevated right atrial pressures; continue with monitoring volume 

status.  Continue with cardiac meds as well.  Strict BP control. 


3.  History of A-fib with SVR, long-term anticoagulant use, supratherapeutic 

INR; Continue with anticoagulation and medication for rate control


4.  Hypothyroidism; continue with Synthroid


5.  Dementia with generalized weakness; continue with medication for 

Alzheimer's.  Continue with physical therapy as patient continues to improve.


6.  Iron deficiency anemia; hemoglobin is stable.  Continue with iron 

supplementation.


7.  Upper respiratory infection; continue with antibiotic therapy and continue 

with medication for upper airway congestion including neb treatments and hold 

steroids.





- Advance Directives


Does patient have a Living Will: No


Does patient have a Durable POA for Healthcare: No





- Code Status/Comfort Care


Code Status Assessed: Yes (Full)

## 2024-12-02 NOTE — PN
Date of Progress Note:  12/02/2024



Subjective:  The patient is complaining of some shortness of breath.  Chest x-ray shows infiltrate, s
ignificant left pleural effusion.  The patient was resumed on diuretics and she is on dialysis for ca
rdiorenal syndrome, acute on chronic kidney injury.



Review of Systems:

Denies PND or orthopnea.  Has some shortness of breath with mild activities.



Physical Examination:

Lungs:  Diminished breath sounds at bases. 

Heart:  S1, S2. 

Abdomen:  Soft, benign. 

Extremities:  No edema.



Impression And Plan:  

1.Acute on chronic kidney injury.  The patient was started on hemodialysis for fluid management and 
acute kidney injury with worsening of the renal function.  Continue dialysis on Monday, Wednesday, Fr
iday.  Avoid nonsteroidal anti-inflammatory medication.  Avoid contrast.

2.Atrial fibrillation, currently controlled.  The patient is on metoprolol for blood pressure.  Amlo
dipine was added to control hypertension.

3.Anemia of chronic kidney disease, iron deficiency.  The patient is on IV iron and Epogen was start
ed.

4.Congestive heart failure.

5.Left pleural effusion.  The patient may benefit from thoracentesis.





PATI/MODL

DD:  12/02/2024 22:06:31Voice ID:  [id number]

DT:  12/02/2024 23:36:13Report ID:  [id number]

## 2024-12-02 NOTE — P.PN
Date of Service: 11/27/24





 Subjective 


Patient's status post hemodialysis access catheter.  Nephrology advised for 

persistently diuresing.  Renal function was continuing to worse even on 

diuresing.  Nephrology felt like patient with cardiorenal syndrome.  No 

improvement with diuresing with progressively worsening renal function.  Having 

to proceed with hemodialysis.





 Physical Examination 





-Vitals


Reviewed





-Physical Exam


General: Alert, Cooperative, Confused, demented, obese; upper airway congestion


Respiratory: Crackles/rales, Expiratory wheezes


Cardiovascular: irregular; irregularly rate and rhythm; occasional bradycardia


Gastrointestinal: Normal bowel sounds, Soft and benign


Musculoskeletal: No clubbing, No swelling


Integumentary: Other (Thin skin with multiple areas of pressure bandages to 

extremities)


Neurological: Normal tone, Normal affect, diffuse weakness; dementia








 Assessment and Plan 





-Assessment/Plan


1.  Acute on chronic kidney disease;  Patient remains with minimal urine output.

 Renal function persistently worsened on diuretics.  Hemodialysis started with 

minimal fluid removed. Continue with strict I's and O's.  Appreciate nephrology 

input.


2.  HFpEF with elevated right atrial pressures; continue with monitoring volume 

status.  Continue with cardiac meds as well.  Strict BP control.  Patient needs 

to hold off on diuretics at this time.


3.  History of A-fib with SVR, long-term anticoagulant use, supratherapeutic 

INR; Continue with anticoagulation and medication for rate control


4.  Hypothyroidism; continue with Synthroid


5.  Dementia with generalized weakness; continue with medication for 

Alzheimer's.  Continue with physical therapy as patient continues to improve.


6.  Iron deficiency anemia; hemoglobin is stable.  Continue with iron 

supplementation.





- Advance Directives


Does patient have a Living Will: No


Does patient have a Durable POA for Healthcare: No





- Code Status/Comfort Care


Code Status Assessed: Yes (Full)

## 2024-12-02 NOTE — RAD REPORT
Procedure: Chest Single View



HISTORY: Shortness of breath



COMPARISON: November 30, 2024



FINDINGS:



Left pleural effusion appears mildly diminished in size.



Small to moderate right pleural effusion



Mild improvement in bilateral pulmonary opacities probably pulmonary edema.



Heart remains enlarged. Central venous catheter in place.







IMPRESSION:



Overall improvement since the prior examination



Reported By: Dylan Reyna 

Electronically Signed:  12/2/2024 7:24 AM

## 2024-12-02 NOTE — P.PN
Date of Service: 11/26/24





 Subjective 


Patient's renal function continues to deteriorate.  Nephrology recommending to 

increase diuresing.  However, patient's renal function not improving whatsoever.

 Volume status is stable.  May need to start with hemodialysis at this time.





 Physical Examination 





-Vitals


Reviewed





-Physical Exam


General: Alert, Cooperative, Confused, demented, obese; 


Respiratory: Crackles/rales, 


Cardiovascular: irregular; irregularly rate and rhythm; occasional bradycardia


Gastrointestinal: Normal bowel sounds, Soft and benign


Musculoskeletal: No clubbing, No swelling


Integumentary: thin skin with multiple areas of pressure bandages to extremities


Neurological: Normal tone, Normal affect, diffuse weakness; dementia








 Assessment and Plan 





-Assessment/Plan


1.  Acute on chronic kidney disease;  Patient remains with minimal urine output.

 Renal function persistently worsened on diuretics.  May start hemodialysis in 

a.m. Continue with strict I's and O's.  Appreciate nephrology input.


2.  HFpEF with elevated right atrial pressures; continue with monitoring volume 

status.  Continue with cardiac meds as well.  Strict BP control.  Patient with 

continuing diuresing per nephrology as they are concerned that patient with 

cardiorenal syndrome.  Patient's mean arterial pressure remains elevated.  

However, renal venous pressure must be significantly elevated for worsening 

renal function with diuresing.  Reviewed echocardiogram.


3.  History of A-fib with SVR, long-term anticoagulant use, supratherapeutic 

INR; Continue with anticoagulation and medication for rate control; appreciate 

cardiology input.  Monitor INR.


4.  Hypothyroidism; continue with Synthroid


5.  Dementia with generalized weakness; continue with medication for 

Alzheimer's.  Continue with physical therapy as patient continues to improve.


6.  Iron deficiency anemia; hemoglobin is stable.  Continue with iron 

supplementation.





- Advance Directives


Does patient have a Living Will: No


Does patient have a Durable POA for Healthcare: No





- Code Status/Comfort Care


Code Status Assessed: Yes (Full)

## 2024-12-02 NOTE — P.PN
Date of Service: 11/30/24





 Subjective 


Patient appears to have some upper airway congestion.  Patient very short of 

breath.  CT scan performed.  Showed bilateral pleural effusions with some 

pulmonary edema.  Continue with hemodialysis as advised per nephrology.  

Echocardiogram reviewed.  Appreciate cardiology recommendation.  May need to 

adjust medication for occasional bradycardia arrhythmia.  Blood pressure has 

improved since starting hemodialysis.





 Physical Examination 





-Vitals


Reviewed





-Physical Exam


General: Alert, Cooperative, Confused, demented, obese; upper airway congestion


Respiratory: Crackles/rales, Expiratory wheezes


Cardiovascular: irregular; irregularly rate and rhythm


Gastrointestinal: Normal bowel sounds, Soft and benign


Musculoskeletal: No clubbing, No swelling


Integumentary: Other (Thin skin with multiple areas of pressure bandages to 

extremities)


Neurological: Normal tone, Normal affect, diffuse weakness; dementia








 Assessment and Plan 





-Assessment/Plan


1.  Acute on chronic kidney disease with progression to end-stage renal disease.

 Patient remains with minimal urine output.  Hemodialysis continued today.  

Strict I's and O's.  May need to arrange for outpatient hemodialysis.  

Appreciate nephrology input.


2.  HFpEF with elevated right atrial pressures; continue with monitoring volume 

status.  Continue with cardiac meds as well.  


3.  History of A-fib with SVR, long-term anticoagulant use, supratherapeutic 

INR; Continue with anticoagulation and medication for rate control


4.  Hypothyroidism; continue with Synthroid


5.  Dementia with generalized weakness; continue with medication for 

Alzheimer's.  Continue with physical therapy as patient continues to improve.


6.  Iron deficiency anemia; hemoglobin is stable.  Continue with iron 

supplementation.


7.  Upper respiratory infection; continue with antibiotic therapy and continue 

with medication for upper airway congestion including neb treatments and 

steroids.





- Advance Directives


Does patient have a Living Will: No


Does patient have a Durable POA for Healthcare: No





- Code Status/Comfort Care


Code Status Assessed: Yes (Full)

## 2024-12-02 NOTE — P.PN
Date of Service: 11/25/24





 Subjective 


Patient with no significant improvement.  Patient's clinical symptoms worsening.

 Patient's renal function worsening.  Possibly remains volume overloaded.  

Nephrology thinking patient needs more aggressive diuresing because of 

cardiorenal syndrome.  However, may need to hold off on diuretics at this time. 

Renal function consistently worsening.  If not improving then we will need to do

hemodialysis to get volume off.





 Physical Examination 





-Vitals


Reviewed





-Physical Exam


General: Alert, Cooperative, Confused, demented, obese; 


Respiratory: Crackles/rales, 


Cardiovascular: irregular; irregularly rate and rhythm; occasional bradycardia


Gastrointestinal: Normal bowel sounds, Soft and benign


Musculoskeletal: No clubbing, No swelling


Integumentary: thin skin with multiple areas of pressure bandages to extremities


Neurological: Normal tone, Normal affect, diffuse weakness; dementia








 Assessment and Plan 





-Assessment/Plan


1.  Acute on chronic kidney disease;  Patient remains with minimal urine output.

 Renal function persistently worsened on diuretics.  May need to consider 

holding diuretics. Continue with strict I's and O's.  Appreciate nephrology 

input.


2.  HFpEF with elevated right atrial pressures; continue with monitoring volume 

status.  Continue with cardiac meds as well.  Strict BP control.  Patient with 

continuing diuresing per nephrology as they are concerned that patient with 

cardiorenal syndrome.  Patient's mean arterial pressure remains elevated.  

However, renal venous pressure must be significantly elevated for worsening 

renal function with diuresing.  Reviewed echocardiogram.


3.  History of A-fib with SVR, long-term anticoagulant use, supratherapeutic 

INR; Continue with anticoagulation and medication for rate control; appreciate 

cardiology input.  Monitor INR.


4.  Hypothyroidism; continue with Synthroid


5.  Dementia with generalized weakness; continue with medication for 

Alzheimer's.  Continue with physical therapy as patient continues to improve.


6.  Iron deficiency anemia; hemoglobin is stable.  Continue with iron 

supplementation.





- Advance Directives


Does patient have a Living Will: No


Does patient have a Durable POA for Healthcare: No





- Code Status/Comfort Care


Code Status Assessed: Yes (Full)

## 2024-12-02 NOTE — P.PN
Subjective


Date of Service: 12/02/24


Primary Care Provider: sees Dr. Schrader, Dr. Martinez, Dr. Maynard


Chief Complaint: SOB


Subjective: Improving


Patient seems to be doing well, but cannot answer a question due to advanced 

dementia.  According to the nurse at bedside she is eating okay, urinate well, 

continue getting hemodialysis as needed.  Follow-up chest x-ray personally 

reviewed, improvement of pulmonary edema and bilateral pleural effusion.








Review of Systems


is unable to be obtained





Physical Examination





- Vital Signs


Temperature: 97 F


Blood Pressure: 168/53


Pulse: 59


Respirations: 22


Pulse Ox (%): 97





- Physical Exam


Other Physical/Emotional Findings: - Physical Exam.  General:  Not acutely ill 

looking, in no apparent distress,.  HEENT: Normocephalic, atraumatic,.  Neck: 

Supple,  without JVD or goiter or thyroid mass.  Respiratory: Normal breathing 

effort, clear to auscultation bilaterally, no crackles no wheezing or rhonchi.  

Cardiovascular: Regular rate and rhythm, S1, S2 normal, no murmur no gallop.  

Gastrointestinal: Normal bowel sounds, nondistended, nontender, No ascites, , No

masses, no hepatosplenomegaly.  Musculoskeletal: No clubbing, No peripheral 

edema.  Integumentary: Multiple ecchymosis on both lower extremity, right arm in

elastic dressing, no sign of bleeding.  Lymphatics: No axilla or cervical 

lymphadenopathy.  Neurology;   alert awake oriented x1, no focal neurologic 

deficit





Assessment And Plan





- Plan


This is a 78 years old female patient with past medical history notable for 

advanced Alzheimer disease, atrial fibrillation on apixaban, who was sent to 

emergency room by PCP for anemia and renal failure





1.  Acute on chronic kidney disease with progression to end-stage renal disease.

 Patient started on hemodialysis, continue on Bumex, calcitriol, hemodialysis as

needed





2.  HFpEF with hypervolemia; ultrafiltration during hemodialysis, follow-up 

chest x-ray showed some improvement of pulmonary edema and bilateral pleural 

effusion


3.  History of A-fib with SVR, on apixaban, 


amiodarone dose reduced to 200 once a day for bradycardia; apixaban on hold due 

to anemia secondary to bleeding from superficial skin lesion of the right arm 


#4 anemia of acute blood loss, iron deficiency anemia and anemia of CKD


Patient received 2 unit packed RBC, no further evidence of clinical bleeding, 

hemoglobin 9.1, will continue iron infusion and epoetin





5.  Upper respiratory infection; no indication of antibiotics, I will 

discontinue empiric antibiotics,Zosyn. 





Plan to downgrade to general medical floor in couple of days.

## 2024-12-02 NOTE — P.PN
Date of Service: 11/29/24





 Subjective 


Patient started on hemodialysis.  Patient more awake and alert.  Minimal volume 

removed today.  Continue monitoring electrolytes and renal function.  Strict I's

and O's.  Continue monitoring cardiac status carefully.





 Physical Examination 





-Vitals


Reviewed





-Physical Exam


General: Alert, Cooperative, Confused, demented, obese; upper airway congestion


Respiratory: Crackles/rales, Expiratory wheezes


Cardiovascular: irregular; irregularly rate and rhythm


Gastrointestinal: Normal bowel sounds, Soft and benign


Musculoskeletal: No clubbing, No swelling


Integumentary: Other (Thin skin with multiple areas of pressure bandages to 

extremities)


Neurological: Normal tone, Normal affect, diffuse weakness; dementia








 Assessment and Plan 





-Assessment/Plan


1.  Acute on chronic kidney disease with progression to end-stage renal disease.

 Patient remains with minimal urine output.  Hemodialysis started with minimal 

fluid removed.  Continue with strict I's and O's.  Monitoring volume status c

losely.  May need to arrange for outpatient hemodialysis.  Appreciate nephrology

input.


2.  HFpEF with elevated right atrial pressures; continue with monitoring volume 

status.  Continue with cardiac meds as well.  


3.  History of A-fib with SVR, long-term anticoagulant use, supratherapeutic 

INR; Continue with anticoagulation and medication for rate control


4.  Hypothyroidism; continue with Synthroid


5.  Dementia with generalized weakness; continue with medication for 

Alzheimer's.  Continue with physical therapy as patient continues to improve.


6.  Iron deficiency anemia; hemoglobin is stable.  Continue with iron 

supplementation.


7.  Upper respiratory infection; continue with antibiotic therapy and continue 

with medication for upper airway congestion including neb treatments and 

steroids.





- Advance Directives


Does patient have a Living Will: No


Does patient have a Durable POA for Healthcare: No





- Code Status/Comfort Care


Code Status Assessed: Yes (Full)

## 2024-12-03 NOTE — PN
Date of Progress Note:  12/03/2024



Subjective:  The patient was admitted to the hospital with acute kidney injury 
secondary to cardiorenal.  The patient was started on dialysis.  The patient 
tolerated very well.



Physical Examination:

Vital Signs:  Blood pressure 195/75, pulse of 67, afebrile. 

Chest:  Faint rales, bilateral. 

Heart:  S1, S2.  Systolic murmur. 

Abdomen:  Soft, nontender. 

Extremities:  No edema. 

Neuro:  Alert.  No focality.



Laboratory Data:  Hemoglobin 9.6.  Sodium 135, potassium 4.3, bicarb 28, BUN 34,
creatinine 2.9, GFR 16.  Calcium 7, phosphorus 2.8, magnesium 2.1.



Current Medications:  The patient is on include albuterol, heparin, Epogen, 
atorvastatin, amlodipine, amiodarone.



Assessment And Plan:  

1.   Acute kidney injury, dialysis dependent.  I am going to continue the 
patient on dialysis Monday, Wednesday, Friday.  We will schedule for dialysis 
tomorrow.

2.   Hypertension, not controlled.  I am going to go ahead and start the patient
on hydralazine.  Continue Bumex.

3.   Anemia of chronic kidney disease, status post transfusion.  We will follow 
up.

4.   Atrial fibrillation, as by Cardiology.  Rate controlled currently.



Time spent examining the patient face-to-face reviewing data lab and radiology 
placing orders or discussing the case with the patient discussing the case with 
the team member including hospitalist and nursing staff more than 55-minute

MILLIE

DD:  12/03/2024 10:17:16   Voice ID:  [id number]

DT:  12/03/2024 11:03:01   Report ID:  [id number]

VANESSA

## 2024-12-03 NOTE — P.PN
Date of Service: 12/03/24





Subjective


Alert but confused, demented, hard of hearing.  Ms. Martinez has improved 

somewhat since admission and has been downgraded to acute floor





Review of systems





10-point ROS is otherwise unremarkable


General: Weakness


Eyes: Unremarkable


ENT: Unremarkable


Respiratory: cough


Cardiovascular: Light Headedness


Gastrointestinal: Unremarkable


Genitourinary: Unremarkable


Musculoskeletal: Unremarkable


Integumentary: Unremarkable


Neurological: Other (Lightheaded)


Lymphatics: Unremarkable





 Physical Examination 





- Physical Exam


General: Alert, Cooperative, Confused, Obese


HEENT: Atraumatic, Normocephalic


Neck: Supple


Respiratory: Crackles/rales, Expiratory wheezes, wet sounding cough


Cardiovascular: Other, irregular


Capillary refill: <2 Seconds


Gastrointestinal: Normal bowel sounds, Soft and benign


Musculoskeletal: No clubbing, No swelling


Integumentary: Other (Thin skin with multiple areas of scabbing, no active 

bleeding)


Neurological: Normal tone, Normal affect, Dementia


Lymphatics: No axilla or inguinal lymphadenopathy


External genitalia: Deferred


Rectal: Deferred





 Assessment and Plan 





- Plan


HF with preserved EF


Echo - NORMAL LEFT VENTRICULAR SYSTOLIC FUNCTION. LEFT VENTRICULAR EJECTION 

FRACTION 55-


60%. NORMAL WALL MOTION.


2. GRADE I DIASTOLIC DYSFUNCTION.


3. MILDLY ELEVATED FILLING PRESSURE. RIGHT ATRIAL PRESSURE 10-15 mmHg.


Telemetry


ACE inhibitor/ARB


Beta-blocker


Cardiology following


Diuresis


Strict Is & Os


Repeat chest x-ray


Daily weight


Education regarding diet and treatment of congestive heart failure





History of A-fib with SVR, long-term anticoagulant use


Hold Eliquis for now


Monitor for bleeding - INR improved, h/h stable


Consider Kcentra 


CKD with KINGA, unknown recent creatinine/GFR - creatinine and GFR decreased to 

2.41 and 20 respectively - started on HD


Dr. Martinez following


Monitor and trend creatinine and GFR


Strict I&O


Acute on chronic anemia, multifactorial including current acute loss, RADHA, and 

anemia of chronic disease secondary to CKD - continue to monitor





Hypothyroidism 


Continue current euthyroid





Dementia


Continue memantine


Reorient frequently


Safety measures/ fall precautions





Iron deficiency anemia


IV iron initiated - given, now on ESAs





Plan to discharge in: Greater than 2 days





- Advance Directives


Does patient have a Living Will: No


Does patient have a Durable POA for Healthcare: No





- Code Status/Comfort Care


Code Status Assessed: Yes (Full)














<Ann Blanton - Last Filed: 12/03/24 13:06>


Her blood pressure is not well-controlled even after dialysis, I will titrate up

carvedilol 12.5 mg twice daily and amlodipine 10 mg once a day. 








<ANNE MARIE Palafox - Last Filed: 12/03/24 15:46>

## 2024-12-04 NOTE — RAD REPORT
Procedure: Chest Lateral Decubitus



HISTORY: Chest pain



FINDINGS:



Partial layering of a right pleural effusion which appears small to moderate



Partial layering of a left pleural effusion which appears moderate



Reported By: Dylan Reyna 

Electronically Signed:  12/4/2024 3:19 PM

## 2024-12-04 NOTE — PN
Date of Progress Note:  12/04/2024



Subjective:  The patient was admitted to the hospital with acute kidney injury 
secondary to poor perfusion ATN.  Patient was initiated on dialysis.  The 
patient tolerating the dialysis.



Objective:  Vital Signs:  Blood pressure 173/66, pulse of 72, afebrile. 

Chest:  Clear to auscultation. 

Heart:  S1, S2.  Systolic murmur. 

Abdomen:  Soft, nontender. 

Extremities:  Trace edema. 

Neurologic:  Alert.  No focality.



Laboratory Data:  Hemoglobin 9.6, sodium 135, potassium 4.3, bicarb 28, BUN 34, 
creatinine 2.9, calcium 7, phosphorus 2.8, magnesium 2.1, albumin 2.4, . 
PC ratio 0.6.



Current Medications:  The patient on, it includes:

1.   IV iron.

2.   Epogen.

3.   Amlodipine.

4.   Amiodarone.

5.   Carvedilol 12.5.

6.   Isosorbide.

7.   Hydralazine 25 t.i.d.

8.   Bumex 2 mg b.i.d.

9.   Levothyroxine.



Assessment And Plan:  

1.   Acute kidney injury secondary to poor perfusion, ATN, dialysis dependent.  
I am going to continue current dialysis regimen.  Awaiting for outpatient setup.

2.   Hypertension, not controlled.  Yesterday, we started hydralazine.  We will 
go ahead and increase carvedilol to 25 mg.  We will watch for the response and 
we will monitor.

3.   Secondary hyperparathyroidism.  Continue calcitriol.

4.   Anemia of chronic kidney disease/iron-deficiency anemia.  Continue 

IV iron.  Continue Epogen and we will follow up with the Primary.

5.   Deconditioning.  Continue PT, OT.  Awaiting for placement.



Time spent examining the patient face-to-face reviewing data lab and radiology 
placing orders or discussing the case with the patient discussing the case with 
the team member including hospitalist and nursing staff more than 55-minute

MA/LISA

DD:  12/04/2024 10:34:22   Voice ID:  [id number]

DT:  12/04/2024 11:21:03   Report ID:  [id number]

MTDD

## 2024-12-04 NOTE — RAD REPORT
EXAMINATION: ONE VIEW CHEST XR



CLINICAL INDICATION: Female, 78 years old.,r/o TB for HD chair time



TECHNIQUE: Frontal chest projection is submitted. Examination is limited by patient positioning and t
echnique.



COMPARISON: 12/2/2024



FINDINGS:

Unchanged positioning of right IJ dual lumen staggered dialysis catheter. Central interstitial promin
ence and patchy perihilar opacities are stable. Retrocardiac opacification and moderate layering

effusion, stable. Peripheral right basal confluent opacity has partially improved with some residual 
right costophrenic angle blunting, suggesting small residual effusion. Diffuse interstitial

coarsening and thickening, stable.  No pneumothorax. Heart is again moderately enlarged. Mediastinal 
contours are unchanged.



IMPRESSION: 



Partial improvement of right basilar pleural-parenchymal opacity. Other stable pleural-parenchymal fi
ndings as above, predominantly suggestive of pulmonary edema. Underlying infectious process cannot

be excluded.







Reported By: Kee Sorenson 

Electronically Signed:  12/4/2024 11:57 AM

## 2024-12-04 NOTE — P.PN
Date of Service: 12/04/24





Subjective


 Ms. Martinez has improved somewhat since admission, she remains quite 

edematous. C/o right lateral rib pain. Pt does have right pleural effusion. Will

repeat xray to eval





Review of systems





10-point ROS is otherwise unremarkable


General: Weakness


Eyes: Unremarkable


ENT: Unremarkable


Respiratory: cough, right lateral rib pain


Cardiovascular: Light Headedness


Gastrointestinal: Unremarkable


Genitourinary: Unremarkable


Musculoskeletal: Unremarkable


Integumentary: Unremarkable


Neurological: Other (Lightheaded)


Lymphatics: Unremarkable





 Physical Examination 





- Physical Exam


General: Alert, Cooperative, Confused, Obese


HEENT: Atraumatic, Normocephalic


Neck: Supple


Respiratory: Crackles/rales, Expiratory wheezes, wet sounding cough


Cardiovascular: Other, irregular, significant peripheral edema


Capillary refill: <2 Seconds


Gastrointestinal: Normal bowel sounds, Soft and benign


Musculoskeletal: No clubbing, No swelling


Integumentary: Other (Thin skin with multiple areas of scabbing, no active 

bleeding)


Neurological: Normal tone, Normal affect, Dementia


Lymphatics: No axilla or inguinal lymphadenopathy


External genitalia: Deferred


Rectal: Deferred





 Assessment and Plan 





- Plan


HF with preserved EF


Echo - NORMAL LEFT VENTRICULAR SYSTOLIC FUNCTION. LEFT VENTRICULAR EJECTION 

FRACTION 55-


60%. NORMAL WALL MOTION.


2. GRADE I DIASTOLIC DYSFUNCTION.


3. MILDLY ELEVATED FILLING PRESSURE. RIGHT ATRIAL PRESSURE 10-15 mmHg.


Telemetry


ACE inhibitor/ARB


Beta-blocker


Cardiology following


Diuresis


Strict Is & Os


Repeat chest x-ray


Daily weight


Education regarding diet and treatment of congestive heart failure





Cardiorenal syndrome


 - had to begin dialysis this admission for cardiorenal syndrome. Dr. Martinez 

plans for M/W/F dialysis as outpt upon discharge.


12/4/24 Spouse does not think he is able to care for/transport Ms. Martinez to 

and from dialysis and requests SNF placement. Will discuss with .





right lateral chest pain


pleural effusion


12/4/24 recheck CXR to re-eval right pleural effusion





History of A-fib with SVR, long-term anticoagulant use


Hold Eliquis for now


Monitor for bleeding - INR improved, h/h stable


Consider Kcentra 


CKD with KINGA, unknown recent creatinine/GFR - creatinine and GFR decreased to 

2.41 and 20 respectively - started on HD


Dr. Martinez following


Monitor and trend creatinine and GFR


Strict I&O


Acute on chronic anemia, multifactorial including current acute loss, RADAH, and 

anemia of chronic disease secondary to CKD - continue to monitor





Hypothyroidism 


Continue current euthyroid





Dementia


Continue memantine


Reorient frequently


Safety measures/ fall precautions





Iron deficiency anemia


IV iron initiated - given, now on ESAs





Plan to discharge in: Greater than 2 days





- Advance Directives


Does patient have a Living Will: No


Does patient have a Durable POA for Healthcare: No





- Code Status/Comfort Care


Code Status Assessed: Yes (Full)

## 2024-12-05 NOTE — PN
Date of Progress Note:  12/05/2024



Subjective:  The patient was admitted to the hospital with acute kidney injury 
multifactorial secondary to ATN.  The patient was started on dialysis.  The 
patient feeling better.



Objective:  Vital Signs:  Blood pressure 150/67, pulse of 60, afebrile.  The 
patient started having good urine output of 510. 

Chest:  Clear to auscultation. 

Heart:  S1, S2.  Systolic murmur. 

Abdomen:  Soft, nontender. 

Extremities:  No edema. 

Neurologic:  Alert.  No focality.



Laboratory Data:  Hemoglobin 9.6, sodium 137, potassium 3.6, bicarb 32, BUN 34, 
creatinine 1.8 before dialysis today, GFR 28, calcium 8, phosphorus 3, albumin 
2.1.



Current Medications:  The patient on include:

1.   Ceftriaxone.

2.   Albuterol.

3.   IV iron.

4.   Eliquis.

5.   Epogen.

6.   Amiodarone.

7.   Carvedilol 25 b.i.d.

8.   Isosorbide.

9.   Mupirocin.



Assessment And Plan:  

1.   Acute kidney injury secondary to poor perfusion, ATN, possible recovery, 
nonoliguric.  I will continue to watch the patient.  We will skip dialysis 
tomorrow and day after and we will follow up the chemistry.

2.   Hypertension, controlled, optimal.  Continue current treatment.

3.   Secondary hyperparathyroidism.  Continue calcitriol.

4.   Anemia of chronic kidney disease, status post transfusion.  

Continue IV iron and Epogen.

5.   Deconditioning.  Continue PT, OT.



Time spent examining the patient face-to-face reviewing data lab and radiology 
placing orders or discussing the case with the patient discussing the case with 
the team member including hospitalist and nursing staff more than 55-minute

MA/LISA

DD:  12/05/2024 12:16:46   Voice ID:  [id number]

DT:  12/05/2024 16:49:47   Report ID:  [id number]

VANESSA

## 2024-12-05 NOTE — P.PN
Date of Service: 12/05/24





Subjective


 Ms. Martinez has improved somewhat since admission, she remains quite 

edematous. C/o right lateral rib pain. Pt does have right pleural effusion. 

repeat xray shows mild improvement. Pt still not really cooperating with PT





Review of systems





10-point ROS is otherwise unremarkable


General: Weakness


Eyes: Unremarkable


ENT: Unremarkable


Respiratory: cough, right lateral rib pain


Cardiovascular: Light Headedness


Gastrointestinal: Unremarkable


Genitourinary: Unremarkable


Musculoskeletal: Unremarkable


Integumentary: Unremarkable


Neurological: Other (Lightheaded)


Lymphatics: Unremarkable





 Physical Examination 





- Physical Exam


General: Alert, Cooperative, Confused, Obese


HEENT: Atraumatic, Normocephalic


Neck: Supple


Respiratory: Crackles/rales, no wheezing, wet sounding cough


Cardiovascular: Other, irregular, significant peripheral edema


Capillary refill: <2 Seconds


Gastrointestinal: Normal bowel sounds, Soft and benign


Musculoskeletal: No clubbing, No swelling


Integumentary: Other (Thin skin with multiple areas of scabbing, no active 

bleeding)


Neurological: Normal tone, Normal affect, Dementia


Lymphatics: No axilla or inguinal lymphadenopathy


External genitalia: Deferred


Rectal: Deferred





 Assessment and Plan 





- Plan


HF with preserved EF


Echo - NORMAL LEFT VENTRICULAR SYSTOLIC FUNCTION. LEFT VENTRICULAR EJECTION 

FRACTION 55-


60%. NORMAL WALL MOTION.


2. GRADE I DIASTOLIC DYSFUNCTION.


3. MILDLY ELEVATED FILLING PRESSURE. RIGHT ATRIAL PRESSURE 10-15 mmHg.


Telemetry


ACE inhibitor/ARB - remains quite hypertensive. increased carvedilol, continues 

on norvasc, and amiodarone as well


Beta-blocker


Cardiology following


Diuresis


Strict Is & Os


Repeat chest x-ray


Daily weight


Education regarding diet and treatment of congestive heart failure





Cardiorenal syndrome


 - had to begin dialysis this admission for cardiorenal syndrome. Dr. Martinez 

plans for M/W/F dialysis as outpt upon discharge.


12/4/24 Spouse does not think he is able to care for/transport Ms. Martinez to 

and from dialysis and requests SNF placement. Will discuss with .





right lateral chest pain


pleural effusion


12/4/24 recheck CXR to re-eval right pleural effusion





History of A-fib with SVR, long-term anticoagulant use


Monitor for bleeding - INR improved, h/h stable


Consider Kcentra 


CKD with KINGA, unknown recent creatinine/GFR - creatinine and GFR decreased to 

2.41 and 20 respectively - started on HD


Dr. Martinez following


Monitor and trend creatinine and GFR


Strict I&O


Acute on chronic anemia, multifactorial including current acute loss, RADHA, and 

anemia of chronic disease secondary to CKD - continue to monitor





Hypothyroidism 


Continue current euthyroid





Dementia


Continue memantine


Reorient frequently


Safety measures/ fall precautions





Iron deficiency anemia


IV iron initiated - given, now on ESAs as well





Plan to discharge in: Greater than 2 days


looking into chair time and [facility] auth as of 12/5/24





- Advance Directives


Does patient have a Living Will: No


Does patient have a Durable POA for Healthcare: No





- Code Status/Comfort Care


Code Status Assessed: Yes (Full)

## 2024-12-06 NOTE — RAD REPORT
EXAM: Fluoroscopy use, Fluoroscopy <1 Hour



HISTORY: [facility] MAIN

 HD CATH



COMPARISON: None



FINDINGS: A total of 2 images were sent to PACS, during a fluoroscopically guided dialysis catheter i
nsertion. No radiologist was involved in protocoling or performance of the study, and no

radiologist was present for the duration of the procedure. No interpretation of the saved images will
 be provided.



Total fluoroscopy time: 0.3 minutes.



IMPRESSION: Documentation of fluoroscopy use as above.



Reported By: Kee Sorenson 

Electronically Signed:  12/6/2024 8:32 AM

## 2024-12-06 NOTE — P.PN
Date of Service: 12/06/24





Subjective


 Ms. Martinez has improved somewhat since admission, she remains quite 

edematous. Pt still not really cooperating with PT. She has been accepted to 

[facility] and to Dialysis chair time Tue/Thurs/Sat at [facility]. Awaiting 

auth to pay transit to dialysis three times weekly from SNF





Review of systems





10-point ROS is otherwise unremarkable


General: Weakness


Eyes: Unremarkable


ENT: Unremarkable


Respiratory: cough, right lateral rib pain


Cardiovascular: Light Headedness


Gastrointestinal: Unremarkable


Genitourinary: Unremarkable


Musculoskeletal: Unremarkable


Integumentary: Unremarkable


Neurological: Other (Lightheaded)


Lymphatics: Unremarkable





 Physical Examination 





- Physical Exam


General: Alert, Cooperative, Confused, Obese


HEENT: Atraumatic, Normocephalic


Neck: Supple


Respiratory: Crackles/rales, no wheezing, wet sounding cough


Cardiovascular: Other, irregular, significant peripheral edema continues


Capillary refill: <2 Seconds


Gastrointestinal: Normal bowel sounds, Soft and benign


Musculoskeletal: No clubbing, No swelling


Integumentary: Other (Thin skin with multiple areas of scabbing, no active 

bleeding)


Neurological: Normal tone, Normal affect, Dementia


Lymphatics: No axilla or inguinal lymphadenopathy


External genitalia: Deferred


Rectal: Deferred





 Assessment and Plan 





- Plan


HF with preserved EF


Echo - NORMAL LEFT VENTRICULAR SYSTOLIC FUNCTION. LEFT VENTRICULAR EJECTION 

FRACTION 55-


60%. NORMAL WALL MOTION.


2. GRADE I DIASTOLIC DYSFUNCTION.


3. MILDLY ELEVATED FILLING PRESSURE. RIGHT ATRIAL PRESSURE 10-15 mmHg.


Telemetry


ACE inhibitor/ARB - remains quite hypertensive. increased carvedilol, continues 

on norvasc, and amiodarone as well


Beta-blocker


Cardiology following


Diuresis


Strict Is & Os


Repeat chest x-ray


Daily weight


Education regarding diet and treatment of congestive heart failure





Cardiorenal syndrome


 - had to begin dialysis this admission for cardiorenal syndrome. Dr. Martinez 

plans for M/W/F dialysis as outpt upon discharge.


12/4/24 Spouse does not think he is able to care for/transport Ms. Martinez to 

and from dialysis and requests SNF placement. Will discuss with .





right lateral chest pain


pleural effusion


12/4/24 recheck CXR to re-eval right pleural effusion - improved





History of A-fib with SVR, long-term anticoagulant use


Monitor for bleeding - INR improved, h/h stable


Consider Kcentra 


CKD with KINGA, unknown recent creatinine/GFR - creatinine and GFR decreased to 

2.41 and 20 respectively - started on HD


Dr. Martinez following


Monitor and trend creatinine and GFR


Strict I&O


Acute on chronic anemia, multifactorial including current acute loss, RADHA, and 

anemia of chronic disease secondary to CKD - continue to monitor





Hypothyroidism 


Continue current euthyroid





Dementia


Continue memantine


Reorient frequently


Safety measures/ fall precautions





Iron deficiency anemia


IV iron initiated - given, now on ESAs as well





Plan to discharge in: Greater than 2 days


looking into chair time and [facility] auth as of 12/5/24


approved by each and now waiting for auth to transfer three times weekly to 

[facility].


will continue to await SNF placement





- Advance Directives


Does patient have a Living Will: No


Does patient have a Durable POA for Healthcare: No





- Code Status/Comfort Care


Code Status Assessed: Yes (Full)

## 2024-12-07 NOTE — PN
Date of Progress Note:  12/06/2024



Subjective:  The patient is admitted to the hospital with acute kidney injury, multifactorial, second
liyah to ATN.  The patient was started on hemodialysis.  The patient is feeling better.



Physical Examination:

Lungs:  Clear to auscultation bilaterally. 

Heart:  S1, S2. 

Abdomen:  Soft, benign. 

Extremities:  No edema.



Impression And Plan:  

1.Acute kidney injury secondary to acute tubular necrosis .  Monitor renal function for any evidence
 of recovery.  The patient has nonoliguric urine output.  The plan is to re-evaluate lab work in the 
morning and assess for possible dialysis.

2.Hypertension, controlled.  Continue to monitor blood pressure.  Continue current treatment.

3.Secondary hyperparathyroidism.  Continue calcitriol.

4.Anemia of chronic disease, status post transfusion.  The patient is on IV iron and Epogen.  Contin
ue current treatment.





PATI/LISA

DD:  12/06/2024 21:26:41Voice ID:  [id number]

DT:  12/07/2024 01:52:05Report ID:  [id number]

## 2024-12-07 NOTE — P.PN
Date of Service: 12/07/24





Subjective


Pending skilled nursing facility placement








Review of systems


10-point ROS is otherwise unremarkable








 Physical Examination 





vital sign


Reviewed





General: Alert, Cooperative, pleasantly confused


Neck: Supple


Respiratory: Crackles/rales, unlabored


Cardiovascular: Other, irregular, lower extremity


Capillary refill: <2 Seconds


Gastrointestinal: Normal bowel sounds, nontender


Musculoskeletal: No clubbing, No swelling


Integumentary: Other, multiple areas of scabs


Neurological: Normal tone, Normal affect, Dementia











 Assessment and Plan 





HF with preserved EF


Echo - NORMAL LEFT VENTRICULAR SYSTOLIC FUNCTION. LEFT VENTRICULAR EJECTION 

FRACTION 55-


60%. NORMAL WALL MOTION.


2. GRADE I DIASTOLIC DYSFUNCTION.


3. MILDLY ELEVATED FILLING PRESSURE. RIGHT ATRIAL PRESSURE 10-15 mmHg.


Telemetry


ACE inhibitor/ARB - remains quite hypertensive. increased carvedilol, continues 

on norvasc, and amiodarone as well


Beta-blocker


Cardiology following


Diuresis


Strict Is & Os


Repeat chest x-ray


Daily weight


Education regarding diet and treatment of congestive heart failure





Cardiorenal syndrome


Nephrology consult to evaluate for need for hemodialysis


 Dr. Martinez plans for M/W/F dialysis as outpt upon discharge.


 - had to begin dialysis this admission for cardiorenal syndrome.


12/4/24 Spouse does not think he is able to care for/transport Ms. Martinez to 

and from dialysis and requests SNF placement. Will discuss with .





right lateral chest pain


pleural effusion


12/4/24 recheck CXR to re-eval right pleural effusion - improved





History of A-fib with SVR, long-term anticoagulant use


Monitor for bleeding - INR improved, h/h stable


Consider Kcentra 


CKD with KINGA, unknown recent creatinine/GFR - creatinine and GFR decreased to 

2.41 and 20 respectively - started on HD


Dr. Martinez following


Monitor and trend creatinine and GFR


Strict I&O


Acute on chronic anemia, multifactorial including current acute loss, RADHA, and 

anemia of chronic disease secondary to CKD - continue to monitor





Hypothyroidism 


Continue current euthyroid





Dementia


Continue memantine


Reorient frequently


Safety measures/ fall precautions





Anemia of chronic disease


IV iron, trend H&H, transfuse less than 7





Plan to discharge in: Greater than 2 days


looking into chair time and [facility] auth as of 12/5/24


approved by each and now waiting for auth to transfer three times weekly to 

[place] [facility].


will continue to await SNF placement





- Advance Directives


Does patient have a Living Will: No


Does patient have a Durable POA for Healthcare: No





- Code Status/Comfort Care


Code Status Assessed: Yes (Full)


, With patient 35 minutes

## 2024-12-08 NOTE — P.PN
Date of Service: 12/08/24





Subjective


 Ms. Martinez has improved somewhat since admission, she remains quite 

edematous. Pt still not really cooperating with PT. She has been accepted to 

[facility] and to Dialysis chair time Tue/Thurs/Sat at [facility]. Awaiting 

auth to pay transit to dialysis three times weekly from SNF - auth obtained. Now

we await insurance auth





Review of systems





10-point ROS is otherwise unremarkable


General: Weakness


Eyes: Unremarkable


ENT: Unremarkable


Respiratory: cough, right lateral rib pain


Cardiovascular: Light Headedness


Gastrointestinal: Unremarkable


Genitourinary: Unremarkable


Musculoskeletal: low back pain


Integumentary: Unremarkable


Neurological: Other (Lightheaded)


Lymphatics: Unremarkable





 Physical Examination 





- Physical Exam


General: Alert, Cooperative, Confused, Obese


HEENT: Atraumatic, Normocephalic


Neck: Supple


Respiratory: Crackles/rales, no wheezing, wet sounding cough


Cardiovascular: Other, irregular, significant peripheral edema continues 3+ 

pitting to feet


Capillary refill: <2 Seconds


Gastrointestinal: Normal bowel sounds, Soft and benign


Musculoskeletal: No clubbing, No swelling


Integumentary: Other (Thin skin with multiple areas of scabbing, no active 

bleeding)


Neurological: Normal tone, Normal affect, Dementia


Lymphatics: No axilla or inguinal lymphadenopathy


External genitalia: Deferred


Rectal: Deferred





 Assessment and Plan 





- Plan


HF with preserved EF


Echo - NORMAL LEFT VENTRICULAR SYSTOLIC FUNCTION. LEFT VENTRICULAR EJECTION 

FRACTION 55-


60%. NORMAL WALL MOTION.


2. GRADE I DIASTOLIC DYSFUNCTION.


3. MILDLY ELEVATED FILLING PRESSURE. RIGHT ATRIAL PRESSURE 10-15 mmHg.


Telemetry


ACE inhibitor/ARB - remains quite hypertensive. increased carvedilol, continues 

on norvasc, and amiodarone as well


Beta-blocker


Cardiology following


Diuresis


Strict Is & Os


Repeat chest x-ray


Daily weight


Education regarding diet and treatment of congestive heart failure





Cardiorenal syndrome


 - had to begin dialysis this admission for cardiorenal syndrome. Dr. Martinez 

plans for M/W/F dialysis as outpt upon discharge.


12/4/24 Spouse does not think he is able to care for/transport Ms. Martinez to 

and from dialysis and requests SNF placement. Will discuss with .





right lateral chest pain


pleural effusion


12/4/24 recheck CXR to re-eval right pleural effusion - improved





History of A-fib with SVR, long-term anticoagulant use


Monitor for bleeding - INR improved, h/h stable


Consider Kcentra 


CKD with KINGA, unknown recent creatinine/GFR - creatinine and GFR decreased to 

2.41 and 20 respectively - started on HD


Dr. Martinez following


Monitor and trend creatinine and GFR


Strict I&O


Acute on chronic anemia, multifactorial including current acute loss, RADHA, and 

anemia of chronic disease secondary to CKD - continue to monitor





Hypothyroidism 


Continue current euthyroid





Dementia


Continue memantine


Reorient frequently


Safety measures/ fall precautions





Iron deficiency anemia


IV iron initiated - given, now on ESAs as well





Back pain


meds as directed 


frequent position change assistance





Plan to discharge in: Greater than 2 days


looking into chair time and [facility] auth as of 12/5/24


approved by each and now waiting for auth to transfer three times weekly to 

[facility]. 12/7/24. 


Now will await insurance auth. Pt is hemodynamically stable to go to SNF upon 

auth


will continue to await SNF placement





- Advance Directives


Does patient have a Living Will: No


Does patient have a Durable POA for Healthcare: No





- Code Status/Comfort Care


Code Status Assessed: Yes (Full)

## 2024-12-08 NOTE — PN
Date of Progress Note:  12/07/2024



Chief Complaint:  Acute kidney injury, severe electrolyte abnormalities.  The patient denies chest pa
in or palpitations.



Physical Examination:

Lungs:  Clear to auscultation bilaterally. 

Heart:  S1, S2. 

Abdomen:  Soft, benign. 

Extremities:  Slight edema.  Generalized weakness.



Impression And Plan:  

1.Acute kidney injury secondary to acute tubular necrosis.  Monitor renal function for evidence of r
ecovery.

2.The patient has nonoliguric urine output.  Plan is to re-evaluate lab work in the morning.  Lex chavis, dialysis is on hold.

3.Hypertension, controlled.  Continue treatment.

4.Anemia of chronic disease, status post transfusion.  Continue IV iron and Epogen.





EB/MODL

DD:  12/07/2024 22:56:21Voice ID:  [id number]

DT:  12/08/2024 02:44:00Report ID:  [id number]

## 2024-12-08 NOTE — PN
Date of Progress Note:  12/08/2024



Chief Complaint:  Acute kidney injury, electrolyte abnormalities.



Review of Systems:

Denies chest pain, palpitation.



Objective:  Lungs:  Clear to auscultation bilaterally. 

Heart:  S1, S2. 

Extremities:  Slight edema.



Impression And Plan:  

1.Acute kidney injury secondary to acute tubular necrosis.  Monitor renal function for evidence of a
ny recovery and dialysis was held yesterday.  Re-evaluate blood work in the morning and plan dialysis
 according to lab results.

2.Hypertension.  Continue treatment.

3.Anemia of chronic disease, status post transfusion.  Continue IV iron and Epogen.





EB/MODL

DD:  12/08/2024 18:45:11Voice ID:  [id number]

DT:  12/08/2024 19:40:53Report ID:  [id number]

## 2024-12-09 NOTE — P.PN
Date of Service: 12/09/24





Subjective


 Ms. Martinez has improved somewhat since admission, she remains quite edematous

to her lower extremities. Dialysis TTS schedule. Pt still not really cooperating

with PT. She has been accepted to [facility] and to Dialysis chair time 

Tue/Thurs/Sat at [facility]. Awaiting auth to pay transit to dialysis three times 

weekly from SNF - auth obtained. Now we await insurance auth





Review of systems





10-point ROS is otherwise unremarkable


General: Weakness


Eyes: Unremarkable


ENT: Unremarkable


Respiratory: cough, right lateral rib pain


Cardiovascular: Light Headedness


Gastrointestinal: Unremarkable


Genitourinary: Unremarkable


Musculoskeletal: low back pain


Integumentary: Unremarkable


Neurological: Other (Lightheaded)


Lymphatics: Unremarkable





 Physical Examination 





- Physical Exam


General: Alert, Cooperative, Confused, Obese


HEENT: Atraumatic, Normocephalic


Neck: Supple


Respiratory: Crackles/rales, no wheezing, wet sounding cough


Cardiovascular: Other, irregular, significant peripheral edema continues 3+ 

pitting to feet


Capillary refill: <2 Seconds


Gastrointestinal: Normal bowel sounds, Soft and benign


Musculoskeletal: No clubbing, No swelling


Integumentary: Other (Thin skin with multiple areas of scabbing, no active 

bleeding)


Neurological: Normal tone, Normal affect, Dementia


Lymphatics: No axilla or inguinal lymphadenopathy


External genitalia: Deferred


Rectal: Deferred





 Assessment and Plan 





- Plan


HF with preserved EF


Echo - NORMAL LEFT VENTRICULAR SYSTOLIC FUNCTION. LEFT VENTRICULAR EJECTION 

FRACTION 55-


60%. NORMAL WALL MOTION.


2. GRADE I DIASTOLIC DYSFUNCTION.


3. MILDLY ELEVATED FILLING PRESSURE. RIGHT ATRIAL PRESSURE 10-15 mmHg.


Telemetry


ACE inhibitor/ARB - remains quite hypertensive. increased carvedilol, continues 

on norvasc, and amiodarone as well


Beta-blocker


Cardiology following


Diuresis


Strict Is & Os


Repeat chest x-ray


Daily weight


Education regarding diet and treatment of congestive heart failure





Cardiorenal syndrome


 - had to begin dialysis this admission for cardiorenal syndrome. Dr. Martinez 

plans for M/W/F dialysis as outpt upon discharge.


12/4/24 Spouse does not think he is able to care for/transport Ms. Martinez to 

and from dialysis and requests SNF placement. Will discuss with .





right lateral chest pain


pleural effusion


12/4/24 recheck CXR to re-eval right pleural effusion - improved





History of A-fib with SVR, long-term anticoagulant use


Monitor for bleeding - INR improved, h/h stable


Consider Kcentra 


CKD with KINGA, unknown recent creatinine/GFR - creatinine and GFR decreased to 

2.41 and 20 respectively - started on HD


Dr. Martinez following


Monitor and trend creatinine and GFR


Strict I&O


Acute on chronic anemia, multifactorial including current acute loss, RADHA, and 

anemia of chronic disease secondary to CKD - continue to monitor





Hypothyroidism 


Continue current euthyroid





Dementia


Continue memantine


Reorient frequently


Safety measures/ fall precautions





Iron deficiency anemia


IV iron initiated - given, now on ESAs as well





Back pain


meds as directed 


frequent position change assistance





Plan to discharge in: Greater than 1 day


looking into chair time and [facility] SNF auth as of 12/5/24


approved by each and now waiting for auth to transfer three times weekly to 

[facility]. 12/7/24. 


Now will await insurance auth. Pt is hemodynamically stable to go to SNF upon 

auth


will continue to await SNF placement


[facility] 





- Advance Directives


Does patient have a Living Will: No


Does patient have a Durable POA for Healthcare: No





- Code Status/Comfort Care


Code Status Assessed: Yes (Full)














<Ann Blanton - Last Filed: 12/09/24 16:27>





Chart has been reviewed.  Events of the last 24 hours have been noted. Case 

discussed with CRIS. I performed a substantial part of the MDM during this 

patient's care today. I personally made or approved the documented management 

plan and acknowledge its risk of complications. I agree with the findings and 

documentation provided in the CRIS's notes





<Beverly Orta - Last Filed: 12/17/24 02:44>

## 2024-12-09 NOTE — PN
Date of Progress Note:  12/09/2024



Chief Complaint:  Acute kidney injury.



Subjective:  The patient presented to the hospital because of congestive heart 
failure exacerbation.  She has multiple medical problems including history of 
cardiorenal syndrome.  The patient was found to have acute on chronic kidney 
injury.  Renal function declined and the patient was started on hemodialysis for
metabolic clearance and to control volemia, provide management for congestive 
heart failure exacerbation.  Over the weekend, dialysis was on hold.  Azotemia 
has increased somewhat, although the patient also developed severe leg edema, 
anasarca and she is to resume dialysis.  The patient remains dialysis dependent.
 Acute kidney injury has not resolved to previous baseline.



Review of Systems:

Denies chest pain, palpitation.



Physical Examination:

Lungs:  Diminished breath sounds at bases. 

Heart:  S1, S2. 

Abdomen:  Soft. 

Extremities:  Edema present in both legs, and there is generalized edema.



Impression And Plan:  

1.   Acute on chronic kidney injury.  The patient will resume hemodialysis 
today.  Ultrafiltration will be obtained to control fluid overload.

2.   Atrial fibrillation.  Recommendation per Cardiology.  The patient is on 
metoprolol.

3.   Anemia of chronic kidney disease and due to iron deficiency, continue IV 
iron and Epogen.  Monitor iron study and adjust treatment accordingly.

4.   Congestive heart failure, acute on chronic.  The patient will resume 
dialysis.

5.   History of left pleural effusion.  The patient previously was to have 
thoracentesis.  Re-evaluate and plan further treatment.





PATI/LISA

DD:  12/09/2024 17:01:05   Voice ID:  [id number]

DT:  12/09/2024 20:29:59   Report ID:  [id number]

VANESSA

## 2024-12-10 NOTE — PN
Date of Progress Note:  12/10/2024



Subjective:  The patient was admitted to the hospital with acute kidney injury, 
cardiorenal.  The patient showed some improvement.  The patient received 
dialysis yesterday as kidney function continues to decline.



Physical Examination:

Vital Signs:  Blood pressure 195/76, pulse of 56, afebrile.  The patient had 
urine output of only 500. 

Chest:  Crackles bilateral. 

Heart:  S1, S2.  Systolic murmur. 

Abdomen:  Soft, nontender. 

Extremities:  +2 edema. 

Neuro:  Alert.  No focality.



Laboratory Data:  Chest x-ray cardiomegaly with congestion.  Sodium 134, 
potassium 4.2, bicarb 30, BUN 37, creatinine 1.7 post dialysis yesterday, 
calcium 8.2.  Albumin 2.2.  Hemoglobin 9.3.



Current Medications:  The patient on include Eliquis, Epogen, amiodarone, 
Norvasc, carvedilol 25 b.i.d., isosorbide, Tylenol.



Assessment And Plan:  

1.   Acute kidney injury, dialysis dependent.  We will continue ultrafiltration 
for her dialysis. With do another session tomorrow and we will follow up 
recovery.

2.   Hypertension, not controlled.  We will follow up blood pressure after 
dialysis tomorrow and after medication today.

3.   Iron deficiency anemia and chronic kidney disease, status post transfusion.
 Continue SUJATHA.

4.   Congestive heart failure with exacerbation.  We will try to establish 
better volume control.

5.   Hyponatremia, will be corrected with dialysis.

6.   Deconditioning.  Continue physical therapy, occupational therapy.



Time spent examining the patient face-to-face reviewing data lab and radiology 
placing orders or discussing the case with the patient discussing the case with 
the team member including hospitalist and nursing staff more than 55-minute

MILLIE

DD:  12/10/2024 09:57:52   Voice ID:  [id number]

DT:  12/10/2024 10:50:23   Report ID:  [id number]

VANESSA

## 2024-12-11 NOTE — PN
Date of Progress Note:  12/11/2024



Subjective:  The patient was admitted to the hospital with acute kidney injury secondary to cardioren
al.  The patient had uremia.  Patient was initiated on dialysis.  The patient has GI bleed status pos
t transfusion.



Objective:  Vital Signs:  Blood pressure 176/71, pulse of 57, afebrile in the morning.  The patient s
een on dialysis.  Currently blood pressure 105/60, pulse of 88. 

Chest:  Crackles with wheezing. 

Heart:  S1, S2.  Systolic murmur. 

Abdomen:  Soft, nontender. 

Extremities:  +1 edema. 

Neurologic:  Alert.  No focality.



Laboratory Data:  Hemoglobin 8.8.  Sodium 130, potassium 4, bicarb 29, BUN 43, creatinine 1.9, calciu
m 8.1, albumin 2.5, corrected calcium is 9.3.



Current Medications:  The patient on include:

1.Albuterol.

2.Epogen.

3.Amiodarone.

4.Carvedilol 25 b.i.d.

5.Atorvastatin.

6.Isosorbide.

7.Bumex.



Assessment And Plan:  

1.End-stage renal disease.  We will continue the patient on dialysis schedule.  We will decrease blo
od flow to 300, low temperature 35.5 and we will monitor.

2.Hypertension.  We will accept blood pressure 150 to 160 to avoid low blood pressure during the jon
lysis to allow better ultrafiltration.

3.Congestive heart failure with exacerbation.  We will continue 

dialysis to optimize fluid status.  Continue Bumex.

4.Deconditioning. Continue PT/OT.





MA/MODL

DD:  12/11/2024 10:46:33Voice ID:  [id number]

DT:  12/11/2024 11:03:25Report ID:  [id number]

## 2024-12-11 NOTE — RAD REPORT
EXAMINATION: ONE VIEW CHEST XR



CLINICAL INDICATION: COPD



TECHNIQUE: Frontal chest projection is submitted. Examination is limited by patient positioning and t
echnique.



COMPARISON: 12/4/2024



FINDINGS:



Right-sided venous catheters tip in SVC. Moderate bilateral pulmonary edema is present. Small right a
nd moderate left pleural effusion. The heart is moderately enlarged. No displaced fractures

identified.



IMPRESSION: 



Moderate CHF versus volume overload pattern, slightly worse than on the comparison study. 







Reported By: Francesco Perez 

Electronically Signed:  12/11/2024 2:09 PM

## 2024-12-12 NOTE — PN
Date of Progress Note:  12/12/2024



Subjective:  The patient was admitted to the hospital with acute kidney injury secondary to cardioren
al and toxic ATN.  The patient was initiated on dialysis.  The patient dialysis dependent.  Kidney fu
nction started showing some recovery.



Physical Examination:

Vital Signs:  Blood pressure 183/89, pulse of 89, afebrile. 

Chest:  Crackles bilateral. 

Heart:  S1, S2, systolic murmur. 

Abdomen:  Soft, nontender. 

Extremities:  Plus edema. 

Neurologic:  Alert, no focality.



Lab:  Hemoglobin 8.8.  Sodium 133, potassium 3.8, bicarb 28, BUN 31, creatinine 1.8.  Calcium 8, phos
phorus 3.9.



Current Medications:  The patient on include:

1.Albuterol.

2.Epogen.

3.Amlodipine 10 mg.

4.Carvedilol.

5.Isosorbide.

6.Bumex 2 mg b.i.d.

7.Hydrocortisone.

8.Calcitriol.



Assessment And Plan:  

1.Acute kidney injury secondary to cardiorenal, still over-volume, oliguric.  I am going to continue
 dialysis for the time being.

2.Hypertension.  Yesterday had low blood pressure on the machine.  We will accept marginal elevation
 in the blood pressure in favor of establishing better volume control on the dialysis.

3.Congestive heart failure with exacerbation.  We will try to 

optimize fluid status.

4.Deconditioning.  Continue PT/OT.





MA/LISA

DD:  12/12/2024 09:58:45Voice ID:  [id number]

DT:  12/12/2024 13:10:40Report ID:  [id number]

## 2024-12-14 NOTE — EDPHYS
Physician Documentation                                                                           

 [facility]                                                                 

Name: Olga Martinez                                                                              

Age: 78 yrs                                                                                       

Sex: Female                                                                                       

: 1946                                                                                   

MRN: [id number]                                                                                   

Arrival Date: 2024                                                                          

Time: 15:22                                                                                       

Account#: [id number]                                                                            

Bed 17                                                                                            

Private MD:                                                                                       

ED Physician Azam Llamas                                                                        

Historical:                                                                                       

- Allergies:                                                                                      

                                                                                             

15:34 PENICILLINS;                                                                            iw  

15:34 Sulfa (Sulfonamide Antibiotics);                                                        iw  

- PMHx:                                                                                           

15:34 cardiiac arrythmia; Chronic pain; Dementia; High Cholesterol; Hypothyroidism; COPD;     iw  

                                                                                                  

- Immunization history:: Adult Immunizations unknown.                                             

- Infectious Disease History:: Denies.                                                            

- Social history:: Smoking status: Patient denies any tobacco usage or history of.                

                                                                                                  

                                                                                                  

Vital Signs:                                                                                      

15:40  / 58; Pulse 61; Resp 16; Temp 97.6; Pulse Ox 99% ; Weight 77.11 kg; Height 5 ft. db  

      8 in. ;                                                                                     

16:30  / 69; Pulse 59; Resp 14; Pulse Ox 99% on 2 lpm NC;                               db  

17:30  / 64; Pulse 60; Resp 16; Pulse Ox 99% on R/A;                                    db  

18:00  / 63; Pulse 60; Resp 16; Pulse Ox 100% on R/A;                                   db  

15:40 Body Mass Index 25.85 (77.11 kg, 172.72 cm)                                             db  

                                                                                                  

MDM:                                                                                              

15:56 Medical Screening Exam initiated                                                        sb4 

                                                                                                  

                                                                                             

16:07 Order name: FAUSTO; Complete Time: 17:10                                                   sb4 

                                                                                                  

Administered Medications:                                                                         

No medications were administered                                                                  

                                                                                                  

                                                                                                  

Disposition Summary:                                                                              

24 17:10                                                                                    

Discharge Ordered                                                                                 

 Notes:       Location: Home                                                                        
  sb4

      Problem: new                                                                            sb4 

      Symptoms: have improved                                                                 sb4 

      Condition: Stable                                                                       sb4 

      Diagnosis                                                                                   

        - Low back pain                                                                       sb4 

        - End stage renal disease                                                             sb4 

      Followup:                                                                               sb4 

        - With: Private Physician                                                                  

        - When: As needed                                                                          

        - Reason: Recheck today's complaints, Re-evaluation by your physician                      

      Discharge Instructions:                                                                     

        - Discharge Summary Sheet                                                             sb4 

        - Acute Back Pain, Adult                                                              sb4 

        - End-Stage Kidney Disease                                                            sb4 

      Forms:                                                                                      

        - Patient Portal Instructions                                                         sb4 

        - Leadership Thank You Letter                                                         sb4 

Addendum:                                                                                         

2024                                                                                        

     00:47 Addendum: HPI: patient with recent diagnosis of uremia requiring hemodialysis 3 times a s
b4

           week presents with complaints of back pain. States that she was at dialysis and the    

           chair was too uncomfortable with her back pain so they sent her to the ED. She has no  

           complaints of any chest pain or shortness of breath. ROS: positive for back pain. all  

           other systems negative. PE: gen- awake, alert, NAD ; head/face- normocephalic,         

           atraumatic ; eyes- EOMI ; ENT- mucous membranes moist ; respiratory- no increased work 

           of breathing or respiratory distress ; cardiovascular- normal rate and rhythm ; skin-  

           no rashes or cellulitis. MDM: BMP is unremarkable, potassium is normal, patient has no 

           complaints, she does not need HD emergently, she can return to HD on Monday. .         

                                                                                                  

Signatures:                                                                                       

Dispatcher [employer]                           Claudia Ferreira RN RN iw Benton, Danielle, RN                    RN   Reena Brush, MARJ                     PAMARILEE sb4                                                  

                                                                                                  

Corrections: (The following items were deleted from the chart)                                    

                                                                                             

16:08 16:08 BASIC METABOLIC PANEL+C.LAB.BRZ ordered. EDMS                                     EDMS

                                                                                                  

**************************************************************************************************

## 2024-12-14 NOTE — ER
Nurse's Notes                                                                                     

 [facility]                                                                 

Name: Olga Martinez                                                                              

Age: 78 yrs                                                                                       

Sex: Female                                                                                       

: 1946                                                                                   

MRN: [id number]                                                                                   

Arrival Date: 2024                                                                          

Time: 15:22                                                                                       

Account#: [id number]                                                                            

Bed 17                                                                                            

Private MD:                                                                                       

Diagnosis: Low back pain;End stage renal disease                                                  

                                                                                                  

Presentation:                                                                                     

                                                                                             

15:27 Chief complaint: EMS states: toned out for mid back pain from dialysis clinic, did not  iw  

      complete dialysis, they gave tylenol at dialysis , she requested stronger pain              

      medicine. Coronavirus screen: At this time, the client does not indicate any symptoms       

      associated with coronavirus-19. Ebola Screen: No symptoms or risks identified at this       

      time. Initial Sepsis Screen: Does the patient meet any 2 criteria? No. Patient's            

      initial sepsis screen is negative. Does the patient have a suspected source of              

      infection? No. Patient's initial sepsis screen is negative. Risk Assessment: Do you         

      want to hurt yourself or someone else? Patient reports no desire to harm self or            

      others. Onset of symptoms was 2024.                                            

15:27 Method Of Arrival: EMS: [facility]                                                iw  

15:27 Acuity: DIMA 3                                                                           iw  

                                                                                                  

Historical:                                                                                       

- Allergies:                                                                                      

15:34 PENICILLINS;                                                                            iw  

15:34 Sulfa (Sulfonamide Antibiotics);                                                        iw  

- PMHx:                                                                                           

15:34 cardiiac arrythmia; Chronic pain; Dementia; High Cholesterol; Hypothyroidism; COPD;     iw  

                                                                                                  

- Immunization history:: Adult Immunizations unknown.                                             

- Infectious Disease History:: Denies.                                                            

- Social history:: Smoking status: Patient denies any tobacco usage or history of.                

                                                                                                  

                                                                                                  

Screening:                                                                                        

15:40 [facility] ED Fall Risk Assessment (Adult) History of falling in the last 3 months,       db  

      including since admission No falls in past 3 months (0 pts) Confusion or Disorientation     

      Yes (5 pts) Intoxicated or Sedated No (0 pts) Impaired Gait No (0 pts) Mobility Assist      

      Device Used No (0 pt) Altered Elimination No (0 pt) Score/Fall Risk Level 0 - 2 = Low       

      Risk Oriented to surroundings, Maintained a safe environment. Abuse screen: Denies          

      threats or abuse. Denies injuries from another. Nutritional screening: No deficits          

      noted. Tuberculosis screening: No symptoms or risk factors identified.                      

                                                                                                  

Assessment:                                                                                       

15:40 Reassessment: Patient appears in no apparent distress at this time. Patient and/or      db  

      family updated on plan of care and expected duration. Pain level reassessed. Patient is     

      alert, oriented x 3, equal unlabored respirations, skin warm/dry/pink. General: Appears     

      in no apparent distress. comfortable, Behavior is calm, cooperative. Pain: Complains of     

      pain in back. Neuro: Level of Consciousness is awake, alert, obeys commands, Oriented       

      to person, place, time, situation. Respiratory: Airway is patent Respiratory effort is      

      even, labored, Respiratory pattern is regular, symmetrical.                                 

16:36 Reassessment: Patient appears in no apparent distress at this time. Patient and/or      db  

      family updated on plan of care and expected duration. Pain level reassessed. Patient is     

      alert, oriented x 3, equal unlabored respirations, skin warm/dry/pink. General: Appears     

      in no apparent distress. comfortable, Behavior is calm, cooperative.                        

17:30 Reassessment: ATTEMPTED TO DISCHARGE PATIENT. PATIENT SPOUSE WANTS PATIENT TO HAVE      db  

      DIALYSIS. WANTS TO SPEAK WITH DIALYSIS.                                                     

17:48 Reassessment: Reassessment: CALLED REPORT TO [facility],         komal SALVADOR. STATES WILL CALL BACK WITH [facility] FOR TRANSPORT.                                     

18:37 Reassessment: Patient appears in no apparent distress at this time. Patient and/or      db  

      family updated on plan of care and expected duration. Pain level reassessed. Patient is     

      alert, oriented x 3, equal unlabored respirations, skin warm/dry/pink. PT IS PENDING        

      RIDE BACK TO FACILITY.                                                                      

18:42 Reassessment: Patient appears in no apparent distress at this time. CALLED [facility] AND THEY STATE UNABLE TO ARRANGE TRANSPORT. THEIR TRANSPORT SERVICE IS              

      UNAVAILABLE. WILL NOTIFY CHARGE NURSE CLAUDIA.                                                

                                                                                                  

Vital Signs:                                                                                      

15:40  / 58; Pulse 61; Resp 16; Temp 97.6; Pulse Ox 99% ; Weight 77.11 kg; Height 5 ft. db  

      8 in. ;                                                                                     

16:30  / 69; Pulse 59; Resp 14; Pulse Ox 99% on 2 lpm NC;                               db  

17:30  / 64; Pulse 60; Resp 16; Pulse Ox 99% on R/A;                                    db  

18:00  / 63; Pulse 60; Resp 16; Pulse Ox 100% on R/A;                                   db  

15:40 Body Mass Index 25.85 (77.11 kg, 172.72 cm)                                               

                                                                                                  

Vitals:                                                                                           

15:40 Cardiac Rhythm Assessment Regular.                                                      db  

                                                                                                  

ED Course:                                                                                        

15:26 Patient arrived in ED.                                                                  iw  

15:29 Triage completed.                                                                       iw  

15:40 Monika Ballard, RN is Primary Nurse.                                                  db  

15:40 Arm band placed on Patient placed in an exam room.                                      db  

15:56 Reena Townsend PA-C is PHCP.                                                            sb4 

15:56 Azam Llamas MD is Attending Physician.                                               sb4 

16:30 Patient has correct armband on for positive identification. Bed in low position. Call   db  

      light in reach. Side rails up X 1. Pulse ox on. NIBP on. Warm blanket given. Pillow         

      given.                                                                                      

16:45 Initial lab(s) drawn, by me, sent to lab. Inserted saline lock: 22 gauge in right       db  

      antecubital area, using aseptic technique. Blood collected. Flushed with 10 mL NS.          

19:01 Provided Education on: DISCHARGE FOLLOWUP.                                              db  

19:01 No provider procedures requiring assistance completed. IV discontinued, intact,         db  

      bleeding controlled, No redness/swelling at site.                                           

                                                                                                  

Administered Medications:                                                                         

No medications were administered                                                                  

                                                                                                  

                                                                                                  

Medication:                                                                                       

15:40 VIS not applicable for this client.                                                     db  

                                                                                                  

Outcome:                                                                                          

17:10 Discharge ordered by MD.                                                                sb4 

19:01 Discharged to nursing home. Report called to  [facility]               db  

19:01 Condition: stable                                                                           

19:01 Discharge instructions given to nursing home, Instructed on discharge instructions,         

      follow up and referral plans.                                                               

19:04 Patient left the ED.                                                                    db  

                                                                                                  

Signatures:                                                                                       

Claudia Covarrubias, RN                     RN   iw                                                   

Monika Ballard, RN                    RN   db                                                   

Reena Townsend PA-C PA-C sb4                                                  

                                                                                                  

Corrections: (The following items were deleted from the chart)                                    

17:50 17:48 Reassessment: db                                                                  db  

17:50 17:48 Reassessment: [facility]       db  

      Reassessment: CALLED REPORT [facility]. db              

                                                                                                  

**************************************************************************************************

## 2024-12-17 NOTE — RAD REPORT
Procedure: Chest Single View



HISTORY: Bradycardia



COMPARISON: December 11, 2024



FINDINGS:



Moderate bilateral opacities.



Moderate left and small to moderate right pleural effusions.



Cardiomegaly. Central venous catheter in place.



The heart is normal size.



IMPRESSION:



These findings likely represent CHF



Reported By: Dylan Reyna 

Electronically Signed:  12/17/2024 3:07 PM

## 2024-12-17 NOTE — ER
Nurse's Notes                                                                                     

 [facility]                                                                 

Name: Olga Martinez                                                                              

Age: 78 yrs                                                                                       

Sex: Female                                                                                       

: 1946                                                                                   

MRN: [id number]                                                                                   

Arrival Date: 2024                                                                          

Time: 13:02                                                                                       

Account#: [id number]                                                                            

Bed 17                                                                                            

Private MD:                                                                                       

Diagnosis: Bradycardia, unspecified;Anemia, unspecified;Hypocalcemia                              

                                                                                                  

Presentation:                                                                                     

                                                                                             

13:16 Chief complaint: EMS states: toned out to [facility] for slow heart rate. HR was 40 per     me1 

      [place] nurse and for EMS. EKG showed junctional rhythm. . [facility] did not do any      

      dialysis today. +4 pitting edema to BLE and edema to arms with compression sleeves in       

      place on arms. Dialysis catheter to Right chest wall. Room air o2 sat 90% for EMS,          

      applied o2 at 4 lpm via nc and sat increased to 96%. Patient denies using o2 at home.       

      Coronavirus screen: Vaccine status: Patient reports receiving the 2nd dose of the covid     

      vaccine. Ebola Screen: No symptoms or risks identified at this time. Initial Sepsis         

      Screen: Does the patient meet any 2 criteria? No. Patient's initial sepsis screen is        

      negative. Does the patient have a suspected source of infection? No. Patient's initial      

      sepsis screen is negative. Risk Assessment: Do you want to hurt yourself or someone         

      else? Patient reports no desire to harm self or others. Onset of symptoms is unknown.       

13:16 Method Of Arrival: EMS: [place] [id number] 

13:16 Acuity: DIMA 3                                                                           me1 

                                                                                                  

Triage Assessment:                                                                                

13:20 General: Appears comfortable, well groomed, well developed, well nourished, Behavior is me1 

      calm, cooperative, appropriate for age. Pain: Complains of pain in back Pain does not       

      radiate. Pain currently is 6 out of 10 on a pain scale. Quality of pain is described as     

      aching, Pain began gradually, Is chronic. EENT: No signs and/or symptoms were reported      

      regarding the EENT system. Neuro: Level of Consciousness is awake, alert, obeys             

      commands, confused, Oriented to person, situation. Cardiovascular: Patient's skin is        

      warm and dry. Respiratory: Reports room air o2 sat was 90%. EMS applied 4 lpm via nc        

      Airway is patent Trachea midline Respiratory effort is even, unlabored, Respiratory         

      pattern is regular, symmetrical. GI: No signs and/or symptoms were reported involving       

      the gastrointestinal system. : No signs and/or symptoms were reported regarding the       

      genitourinary system. Derm: No signs and/or symptoms reported regarding the                 

      dermatologic system. Musculoskeletal: No signs and/or symptoms reported regarding the       

      musculoskeletal system.                                                                     

                                                                                                  

Historical:                                                                                       

- Allergies:                                                                                      

13:20 PENICILLINS;                                                                            me1 

13:20 Sulfa (Sulfonamide Antibiotics);                                                        me1 

13:20 artificial sweeteners;                                                                  me1 

- PMHx:                                                                                           

13:20 cardiiac arrythmia; Chronic pain; COPD; Dementia; High Cholesterol; Hypothyroidism;     me1 

- PSHx:                                                                                           

13:20 dialysis catheter placement;                                                            me1 

                                                                                                  

- Immunization history:: Adult Immunizations up to date.                                          

- Infectious Disease History:: Denies.                                                            

- Social history:: Smoking status: Patient reports the use of cigarette tobacco                   

  products, smokes one pack cigarettes per day.                                                   

                                                                                                  

                                                                                                  

Screenin:24 [facility] ED Fall Risk Assessment (Adult) History of falling in the last 3 months,       me1 

      including since admission No falls in past 3 months (0 pts) Confusion or Disorientation     

      No (0 pts) Intoxicated or Sedated No (0 pts) Impaired Gait Yes (1 pt) Mobility Assist       

      Device Used Yes (1 pt) Altered Elimination Yes (1 pt) Score/Fall Risk Level 0 - 2 = Low     

      Risk Maintained a safe environment, Provided non-skid footwear, Hourly rounding (assess     

      needs \T\ fall precautionary measures) done. Abuse screen: Denies threats or abuse.         

      Nutritional screening: No deficits noted. Tuberculosis screening: No symptoms or risk       

      factors identified.                                                                         

                                                                                                  

Assessment:                                                                                       

13:24 General: See triage assessment..                                                        me1 

                                                                                                  

Vital Signs:                                                                                      

13:16  / 58; Pulse 38; Resp 18; Temp 98.3; Pulse Ox 98% on 4 lpm NC; Weight 77.11 kg;   me1 

      Height 5 ft. 8 in. ; Pain 6/10;                                                             

14:00  / 56; Pulse 40; Resp 16; Pulse Ox 99% on 2 lpm NC;                               me1 

15:00  / 81; Pulse 35; Resp 16; Pulse Ox 100% on 2 lpm NC;                              me1 

16:00  / 69; Pulse 68; Resp 14; Pulse Ox 99% on 2 lpm NC;                               me1 

17:00  / 67; Pulse 67; Resp 16; Pulse Ox 99% on 2 lpm NC;                               me1 

18:00  / 70; Pulse 66; Resp 13; Pulse Ox 100% ;                                         me1 

13:16 Body Mass Index 25.85 (77.11 kg, 172.72 cm)                                             me1 

13:16 Pain Scale: Adult                                                                       me1 

                                                                                                  

ED Course:                                                                                        

13:08 Patient arrived in ED.                                                                  me1 

13:13 Juan Villanueva DO is Attending Physician.                                                ms3 

13:20 Triage completed.                                                                       me1 

13:20 Arm band placed on Patient placed in an exam room.                                      me1 

13:24 Patient has correct armband on for positive identification. Bed in low position. Call   me1 

      light in reach. Side rails up X2. Provided Education on: POC. Verbalized                    

      understanding.. Client placed on continuous cardiac and pulse oximetry monitoring. NIBP     

      monitoring applied. Cardiac monitor on. Pulse ox on. NIBP on.                               

13:24 No provider procedures requiring assistance completed.                                  me1 

13:54 Ksenia He, RN is Primary Nurse.                                                me1 

13:55 EKG done, by ED staff, reviewed by Juan Villanueva DO.                                      me1 

14:12 Basic Metabolic Panel Sent.                                                             me1 

14:12 CBC with Diff Sent.                                                                     me1 

14:13 LFT's Sent.                                                                             me1 

14:13 Magnesium Sent.                                                                         me1 

14:13 NT PRO-BNP Sent.                                                                        me1 

14:13 PT-INR Sent.                                                                            me1 

14:13 Troponin HS Sent.                                                                       me1 

14:13 Initial lab(s) drawn, by me, sent to lab. Inserted saline lock: 22 gauge in right       me1 

      antecubital area, using aseptic technique.                                                  

14:32 XRAY Chest (1 view) In Process Unspecified.                                             EDMS

15:25 initiated transfer to [facility].                                               bd  

16:23 Terence Prasad MD is Hospitalizing Provider.                                           ms3 

22:33 Patient admitted, IV remains in place.                                                  me1 

                                                                                             

06:58 Primary Nurse role handed off by Ksenia He, BENIGNO                                 bp  

06:58 Dewayne Bell, RN is Primary Nurse.                                                    bp  

                                                                                                  

Administered Medications:                                                                         

12                                                                                             

15:33 Drug: Calcium Gluconate IVPB 2 grams IVPB once over 60 mins; (mix in  mL) Route:  me1 

      IVPB; Infused Over: 60 mins; Site: right antecubital;                                       

16:33 Follow up: Response: No adverse reaction; IV Status: Completed infusion; IV Intake:     me1 

      100ml                                                                                       

                                                                                                  

                                                                                                  

Medication:                                                                                       

13:24 VIS not applicable for this client.                                                     me1 

                                                                                                  

Intake:                                                                                           

16:33 IV: 100ml; Total: 100ml.                                                                me1 

                                                                                                  

Outcome:                                                                                          

15:20 ER care complete, transfer ordered by MD.                                               ms3 

16:23 Decision to Hospitalize by Provider.                                                    ms3 

22:33 Admitted to ER Hold.  Please see [facility] for further documentation.                    me1 

22:33 Condition: stable                                                                           

22:33 Instructed on the need for admit,                                                           

                                                                                             

07:42 Patient left the ED.                                                                    bp  

                                                                                                  

Signatures:                                                                                       

Dispatcher [employer]                           EDMS                                                 

Maria Esther Bullock Brian, RN                      RN   bp                                                   

Juan Villanueva DO                        DO   ms3                                                  

Ksenia He, BENIGNO                  RN   me1                                                  

                                                                                                  

Corrections: (The following items were deleted from the chart)                                    

                                                                                             

13:22 13:20 PSHx: None; me1                                                                   me1 

                                                                                                  

************************************************************************************************** 98.3

## 2024-12-17 NOTE — EKG
Test Date:    2024-11-23               Test Time:    20:43:02

Technician:   ENMA                                   

                                                     

MEASUREMENT RESULTS:                                       

Intervals:                                           

Rate:         51                                     

DC:           156                                    

QRSD:         102                                    

QT:           562                                    

QTc:          517                                    

Axis:                                                

P:                                                   

DC:           156                                    

QRS:          -8                                     

T:            3                                      

                                                     

INTERPRETIVE STATEMENTS:                                       

                                                     

Sinus bradycardia with sinus arrhythmia

Prolonged QT

Abnormal ECG

Compared to ECG 04/07/2018 22:11:43

Prolonged QT interval now present



Electronically Signed On 12-17-24 12:11:36 CST by Ken Delgado

## 2024-12-17 NOTE — P.PN
Date of Service: 12/10/24








 Subjective 


Patient continues to improve.  Patient's respiratory status is stable.  Patient 

is doing much better.  Getting patient to work with physical therapy.  Awaiting 

for appeal for SNF placement.  Possible discharge to nursing home over the next 

48 hours.








 Physical Examination 





-Vitals


Reviewed





-Physical Exam


General: Alert, Cooperative, Confused, demented, obese


Respiratory: Crackles/rales, Expiratory wheezes


Cardiovascular: irregular; irregularly rate and rhythm


Gastrointestinal: Normal bowel sounds, Soft and benign


Musculoskeletal: No clubbing, No swelling


Integumentary: Other (Thin skin with multiple areas of pressure bandages to 

extremities)


Neurological: No focal deficits, diffuse weakness; dementia








 Assessment and Plan 





-Assessment/Plan


1.  Acute on chronic kidney disease with progression to end-stage renal disease.

 Patient remains with minimal urine output.  Hemodialysis per nephrology.  

Strict I's and O's.  Arranged for outpatient hemodialysis.  Appreciate nephrolog

y input.


2.  HFpEF with elevated right atrial pressures; continue with monitoring volume 

status.  Continue with cardiac meds as well.  


3.  History of A-fib with SVR, long-term anticoagulant use, supratherapeutic 

INR; Continue with anticoagulation and medication for rate control


4.  Hypothyroidism; continue with Synthroid


5.  Dementia with generalized weakness; continue with medication for 

Alzheimer's.  Continue with physical therapy as patient continues to improve.


6.  Iron deficiency anemia; hemoglobin is stable.  Continue with iron 

supplementation.


7.  Upper respiratory infection; continue with antibiotic therapy and continue 

with medication for upper airway congestion including neb treatments and 

steroids.





- Advance Directives


Does patient have a Living Will: No


Does patient have a Durable POA for Healthcare: No





- Code Status/Comfort Care


Code Status Assessed: Yes (Full)

## 2024-12-17 NOTE — P.PN
Date of Service: 12/11/24








 Subjective 


Patient's respiratory status has improved.  Patient denies any new complaints.  

Patient is feeling better.  Patient got out of bed into a wheelchair and doing 

much better.








 Physical Examination 





-Vitals


Reviewed





-Physical Exam


General: Alert, Cooperative, Confused, demented, obese


Respiratory: Crackles/rales, Expiratory wheezes


Cardiovascular: irregular; irregularly rate and rhythm


Gastrointestinal: Normal bowel sounds, Soft and benign


Musculoskeletal: No clubbing, No swelling


Integumentary: Other (Thin skin with multiple areas of pressure bandages to 

extremities)


Neurological: No focal deficits, diffuse weakness; dementia








 Assessment and Plan 





-Assessment/Plan


1.  Acute on chronic kidney disease with progression to end-stage renal disease.

 Patient remains with minimal urine output.  Hemodialysis per nephrology.  

Strict I's and O's.  Arranged for outpatient hemodialysis.  Appreciate 

nephrology input.


2.  HFpEF with elevated right atrial pressures; continue with monitoring volume 

status.  Continue with cardiac meds as well.  


3.  History of A-fib with SVR, long-term anticoagulant use, supratherapeutic I

NR; Continue with anticoagulation and medication for rate control


4.  Hypothyroidism; continue with Synthroid


5.  Dementia with generalized weakness; continue with medication for 

Alzheimer's.  Continue with physical therapy as patient continues to improve.


6.  Iron deficiency anemia; hemoglobin is stable.  Continue with iron 

supplementation.


7.  Upper respiratory infection; continue with antibiotic therapy and continue 

with medication for upper airway congestion including neb treatments and 

steroids.





- Advance Directives


Does patient have a Living Will: No


Does patient have a Durable POA for Healthcare: No





- Code Status/Comfort Care


Code Status Assessed: Yes (Full)

## 2024-12-17 NOTE — EDPHYS
Physician Documentation                                                                           

 [facility]                                                                 

Name: Olga Martinez                                                                              

Age: 78 yrs                                                                                       

Sex: Female                                                                                       

: 1946                                                                                   

MRN: [id number]                                                                                   

Arrival Date: 2024                                                                          

Time: 13:02                                                                                       

Account#: [id number]                                                                            

Bed 17                                                                                            

Private MD:                                                                                       

ED Physician Juan Villanueva                                                                         

HPI:                                                                                              

                                                                                             

15:21 This 78 yrs old Female presents to ER via EMS with complaints of slow heart rate.       ms3 

15:21 Olga Martinez is a 78-year-old female presenting to the emergency department after     ms3 

      experiencing slow heart rates at her dialysis session. She is currently on supplemental     

      oxygen in the hospital, although she does not use oxygen at home. She mentions feeling      

      short of breath and having nasal congestion, which she attributes to sinus issues.          

      There is a history of dialysis for kidney problems. .                                       

                                                                                                  

Historical:                                                                                       

- Allergies:                                                                                      

13:20 PENICILLINS;                                                                            me1 

13:20 Sulfa (Sulfonamide Antibiotics);                                                        me1 

13:20 artificial sweeteners;                                                                  me1 

- PMHx:                                                                                           

13:20 cardiiac arrythmia; Chronic pain; COPD; Dementia; High Cholesterol; Hypothyroidism;     me1 

- PSHx:                                                                                           

13:20 dialysis catheter placement;                                                            me1 

                                                                                                  

- Immunization history:: Adult Immunizations up to date.                                          

- Infectious Disease History:: Denies.                                                            

- Social history:: Smoking status: Patient reports the use of cigarette tobacco                   

  products, smokes one pack cigarettes per day.                                                   

                                                                                                  

                                                                                                  

ROS:                                                                                              

15:21 Constitutional: Negative for fever, and chills. Cardiovascular: Negative for chest      ms3 

      pain, and palpitations. Respiratory: Negative for shortness of breath, cough, wheezing,     

      and pleuritic chest pain, Abdomen/GI: Negative for abdominal pain, nausea, vomiting,        

      diarrhea, and constipation,                                                                 

15:21 ENT: Positive for sinus congestion,                                                         

                                                                                                  

Exam:                                                                                             

15:21 Constitutional:  This is a well developed, well nourished patient who is awake, alert,  ms3 

      and in no acute distress. Chest/axilla:  Normal chest wall appearance and motion.           

      Nontender with no deformity.   Respiratory:  Lungs have equal breath sounds                 

      bilaterally, clear to auscultation and percussion.  No rales, rhonchi or wheezes noted.     

       No increased work of breathing, no retractions or nasal flaring. Abdomen/GI:  Soft,        

      non-tender, with normal bowel sounds.  No distension or tympany.  No guarding or            

      rebound.  No evidence of tenderness throughout. Skin:  Warm, dry with normal turgor.        

      Normal color with no rashes, no lesions, and no evidence of cellulitis.                     

15:21 Cardiovascular: Rate: bradycardic, Rhythm: regular, Pulses: no pulse deficits are           

      appreciated, Heart sounds: normal, normal S1and S2,                                         

15:35 ECG was reviewed by the Attending Physician.                                            ms3 

                                                                                                  

Vital Signs:                                                                                      

13:16  / 58; Pulse 38; Resp 18; Temp 98.3; Pulse Ox 98% on 4 lpm NC; Weight 77.11 kg;   me1 

      Height 5 ft. 8 in. ; Pain 6/10;                                                             

14:00  / 56; Pulse 40; Resp 16; Pulse Ox 99% on 2 lpm NC;                               me1 

15:00  / 81; Pulse 35; Resp 16; Pulse Ox 100% on 2 lpm NC;                              me1 

16:00  / 69; Pulse 68; Resp 14; Pulse Ox 99% on 2 lpm NC;                               me1 

17:00  / 67; Pulse 67; Resp 16; Pulse Ox 99% on 2 lpm NC;                               me1 

18:00  / 70; Pulse 66; Resp 13; Pulse Ox 100% ;                                         me1 

13:16 Body Mass Index 25.85 (77.11 kg, 172.72 cm)                                             me1 

13:16 Pain Scale: Adult                                                                       me1 

                                                                                                  

MDM:                                                                                              

13:47 Medical Screening Exam initiated                                                        ms3 

15:12 ED course: Discussed case with Dr Delgado. Patient with idioventricular escape rhythm    ms3 

      and no EP coverage at [facility]. Patient will need transfer to higher level of care.       

15:21 Differential diagnosis: abnormal EKG, acute myocardial infarction, coronary artery      ms3 

      disease. Data reviewed: vital signs, nurses notes, lab test result(s), EKG, radiologic      

      studies, and as a result, I will transfer patient. I considered the following discharge     

      prescriptions or medication management in the emergency department Medications were         

      administered in the Emergency Department. See MAR. Independent interpretation of the        

      following test(s) in the Emergency Department EKG: See my EKG interpretation above.         

      Counseling: I had a detailed discussion with the patient and/or guardian regarding the      

      historical points, exam findings, and any diagnostic results supporting the                 

      discharge/admit diagnosis, lab results, radiology results, the need to transfer to          

      another facility, for higher level of care.                                                 

16:20 ED course: Discussed case with Dr. Delgado and his recommendation was for transfer.      ms3 

      Spoke with Dr. Waller at [facility] in the [facility] who recommended CCU.       

      Patient and her family do not wish to be transferred to [facility] at this time and wanted me to speak with the patient's cardiologist .      

      In speaking with Dr. Cannon patient has a history of PVCs causing her heart rate to        

      appear to be in the 30s. On monitors her heart rate has been as low as 45. He               

      recommends a discharge. Discussed conversation with Dr. Cannon with the patient and        

      her family and they would like patient to receive dialysis and be observed. Spoke with      

      Dr. Anne and he agrees with observation; however, family needs to be aware that          

      electrophysiology and Cath Lab are not available at night at [facility]. Discussed this     

      with the patient and her family. Spoke to Dr. Martinez regarding dialysis and he will       

      arrange. Discussed case with GEORGE Zhou on behalf of Dr. Prasad and he accepts         

      patient..                                                                                   

                                                                                                  

                                                                                             

13:43 Order name: Basic Metabolic Panel; Complete Time: 15:00                                 ms3 

                                                                                             

13:43 Order name: CBC with Diff                                                               ms3 

                                                                                             

13:43 Order name: LFT's; Complete Time: 15:00                                                 ms3 

                                                                                             

13:43 Order name: Magnesium; Complete Time: 15:00                                             ms3 

                                                                                             

13:43 Order name: NT PRO-BNP; Complete Time: 15:00                                            ms3 

                                                                                             

13:43 Order name: PT-INR; Complete Time: 15:00                                                ms3 

                                                                                             

13:43 Order name: Troponin HS; Complete Time: 15:00                                           ms3 

                                                                                             

17:41 Order name: Basic Metabolic Panel                                                       EDMS

                                                                                             

17:41 Order name: Basic Metabolic Panel                                                       EDMS

                                                                                             

17:41 Order name: CBC with Automated Diff                                                     EDMS

                                                                                             

17:41 Order name: CBC with Automated Diff                                                     EDMS

                                                                                             

19:37 Order name: CBC Smear Scan                                                              EDMS

                                                                                             

13:43 Order name: XRAY Chest (1 view); Complete Time: 15:11                                   ms3 

                                                                                             

17:41 Order name: CONS Physician Consult                                                      EDMS

                                                                                             

13:43 Order name: Cardiac monitoring; Complete Time: 15:24                                    ms3 

                                                                                             

13:43 Order name: EKG - Nurse/Tech; Complete Time: 13:55                                      ms3 

                                                                                             

13:43 Order name: IV Saline Lock; Complete Time: 14:12                                        ms3 

                                                                                             

13:43 Order name: Labs collected and sent; Complete Time: 14:12                               ms3 

                                                                                             

13:43 Order name: O2 Per Protocol; Complete Time: 13:55                                       ms3 

                                                                                             

13:43 Order name: O2 Sat Monitoring; Complete Time: 13:55                                     ms3 

                                                                                                  

ECG:                                                                                              

15:35 Rate is 60 beats/min. Rhythm is regular. Left axis deviation noted. QRS interval is     ms3 

      normal. QT interval is normal. Clinical impression: Idioventricular. Interpreted by me.     

      Reviewed by me.                                                                             

                                                                                                  

Administered Medications:                                                                         

15:33 Drug: Calcium Gluconate IVPB 2 grams IVPB once over 60 mins; (mix in  mL) Route:  me1 

      IVPB; Infused Over: 60 mins; Site: right antecubital;                                       

16:33 Follow up: Response: No adverse reaction; IV Status: Completed infusion; IV Intake:     me1 

      100ml                                                                                       

                                                                                                  

                                                                                                  

Disposition Summary:                                                                              

24 16:23                                                                                    

Hospitalization Ordered                                                                           

 Notes:       Hospitalization Status: Inpatient Admission                                           
  ms3

      Provider: Terence Prasad                                                                ms3 

      Condition: Stable(24 16:23)                                                       ms3 

      Problem: new(24 16:23)                                                            ms3 

      Symptoms: are unchanged(24 16:23)                                                 ms3 

      Bed/Room Type: Standard                                                                 ms3 

      Location: Telemetry/MedSurg (Inpatient)(24 06:39)                                   

      Room Assignment: 428(24 06:39)                                                      

      Diagnosis                                                                                   

        - Bradycardia, unspecified(24 16:23)                                            ms3 

        - Anemia, unspecified                                                                 ms3 

        - Hypocalcemia                                                                        ms3 

      Forms:                                                                                      

        - Medication Reconciliation Form                                                      ms3 

        - SBAR form                                                                           ms3 

        - Leadership Thank You Letter                                                         ms3 

Critical care time excluding procedures:                                                          

16:22 Critical care time: Bedside Care: 20 minutes, Consultation: 15 minutes, Family          ms3 

      Intervention: 10 minutes. Total time: 45 minutes                                            

                                                                                                  

Signatures:                                                                                       

Dispatcher [employer]                           Justina Mathew RN                     RN   Juan Belle DO DO   ms3                                                  

Mary Carmen Cota                            rv1                                                  

Ksenia He, BENIGNO                  RN   me1                                                  

                                                                                                  

Corrections: (The following items were deleted from the chart)                                    

13:22 13:20 PSHx: None; me1                                                                   me1 

13:43 13:43 BASIC METABOLIC PANEL+C.LAB.BRZ ordered. EDMS                                     EDMS

13:43 13:43 CBC+H.LAB.BRZ ordered. EDMS                                                       EDMS

13:43 13:43 HEPATIC FUNCTION+C.LAB.BRZ ordered. EDMS                                          EDMS

13:43 13:43 MAGNESIUM+C.LAB.BRZ ordered. EDMS                                                 EDMS

13:43 13:43 PROBNP+C.LAB.BRZ ordered. EDMS                                                    EDMS

13:43 13:43 PROTIME (+INR)+COAG.LAB.BRZ ordered. EDMS                                         EDMS

13:43 13:43 Troponin High Sensitivity+C.LAB.BRZ ordered. EDMS                                 EDMS

13:44 13:43 Chest Single View+RAD.RAD.BRZ ordered. EDMS                                       EDMS

16:23 15:20 Dr ms3                                                                            ms3 

16:23 15:20 [facility] ms3                                                           ms3 

16:23 15:20 Higher level of care ms3                                                          ms3 

16:23 15:20 Stable ms3                                                                        ms3 

16:23 15:20 new ms3                                                                           ms3 

16:23 15:20 are unchanged ms3                                                                 ms3 

16:23 15:20 Bradycardia, unspecified ms3                                                      ms3 

16:23 15:20 End stage renal disease ms3                                                       ms3 

19:00 16:22 Critical Care: ms3                                                                ms3 

20:18 16:23 Intensive Care Unit ms3                                                           rv1 

20:18 16:23 ms3                                                                               rv1 

20:19 20:18 rv1                                                                               rv1 

20:39 20:19 408 rv1                                                                           rv1 

20:41 20:18 Telemetry/MedSurg (Inpatient) rv1                                                 rv1 

20:41 20:39 rv1                                                                               rv1 

                                                                                             

06:39 12 20:41 [facility] ER HOLD rv1                                                            kl  

                                                                                             

06:39 12 20:41 ERHOLD- rv1                                                                 kl  

                                                                                                  

**************************************************************************************************

## 2024-12-17 NOTE — P.HP
Certification for Inpatient


With expected LOS: >2 Midnights


Practitioner: I am a practitioner with admitting privileges, knowledge of 

patient current condition, hospital course, and medical plan of care.


Services: Services provided to patient in accordance with Admission requirements

found in Title 42 Section 412.3 of the Code of Federal Regulations





Patient History


Date of Service: 12/17/24


Reason for admission: Bradycardia at her dialysis center 


History of Present Illness: 


This is 78 years old female patient with a complex medical problem including A-

fib on apixaban, congestive heart failure, ESRD on hemodialysis, dementia, 

anemia of iron deficiency and acute blood loss who recently discharged from 

hospital to nursing home and then he was found to have bradycardia with heart 

rate in the 50s when she presented to the dialysis center for scheduled 

hemodialysis.  She did not get dialyzed and brought back to the emergency room 

for evaluation.





Her heart rate was 60/min, twelve-lead EKG shows normal sinus rhythm, patient 

was given 1 dose of calcium gluconate and being admitted on general medical 

floor.  She denied any presyncopal or syncopal attack.  Per her  her 

heart rate has been low, cardiology has been changing her medication.





Allergies





Sulfa (Sulfonamide Antibiotics) [Sulfa(Sulfonamide Antibiotics)] Allergy 

(Intermediate, Verified 09/21/20 15:05)


   unknown


artificial sweetners Allergy (Uncoded 09/21/20 15:05)


   Anaphylaxis





Home Medications: 








Atorvastatin Calcium [Lipitor] 40 mg PO BEDTIME 04/08/18 


Levothyroxine Sodium [Unithroid] 37.5 mcg PO DAILY 11/23/24 


Memantine HCl 5 mg PO BID 11/23/24 


Albuterol Neb [Proventil 0.083% Neb Soln] 2.5 mg NEB [id number] PRN  amp 12/12/24 


Amiodarone HCl [Cordarone*] 200 mg PO DAILY #30 tab 12/12/24 


Amlodipine [Norvasc*] 10 mg PO DAILY #30 tab 12/12/24 


Apixaban [Eliquis *] 2.5 mg PO BID #60 tab 12/12/24 


Arformoterol Tartrate [Brovana] 15 mcg NEB BIDRESP  vial.neb 12/12/24 


Bumetanide [Bumex*] 2 mg PO BID #120 tab 12/12/24 


Calcitrol [Rocaltrol*] 0.5 mcg PO DAILY  cap 12/12/24 


Hydralazine HCl 10 mg PO TID #90 tab 12/12/24 


Hydrocortisone Cream [Hydrocortisone 1% Cream*] 1 appl TOP TID PRN  tube 

12/12/24 


Isosorbide Dinit [Isordil*] 20 mg PO BID  tab 12/12/24 








- Past Medical/Surgical History


Diabetic: No


-: SVT


-: chronic pain syndrome


-: dyslipidemia


-: hypothyroidism


-: A-fib


-: Heart failure with unknown EF


-: Dementia


-: Osteoporosis


-: bupivicain pain pump


Psychosocial/ Personal History: Lives at home with her .  Has dementia.  

Denies having home O2.





- Family History


  ** Father


-: Heart disease





  ** Mother


-: Heart disease





  ** Sister


-: Cancer





- Social History


Alcohol use: No


CD- Drugs: No


Caffeine use: No





Review of Systems


Other: 


Consitutional; fever(-), chills (-), rigor(-), night sweat(-), unintentional 

weight loss(-)


HEENT; diplopia  (-), rhinorrhea (-), epistaxis (-), otorrhea (-), otalgia (-)


Respiratory; shortness of breath  (-), wheezing (-), cough (-), sputum (-), 

pleuritic chest pain (-)


Cardiovascular; chest pain  (-), peripheral edema (-), paroxysmal nocturnal 

dyspnea (-), orthopnea (-)


Gastrointestinal; nausea (-), vomiting (-), abdominal pain (-), diarrhea  (-), 

constipation  (-), melena (-), hematochezia  (-)


Urinary; urinary frequency  (-), dysuria  (-), urgency  (-), flank pain  (-), 

gross hematuria  (-), incontinence  (-)


Skin; rash (-), pruritus (-)


CNS; headache (-), paresthesia (-), numbness (-), paralysis (-)








Physical Examination





- Physical Exam


Other Physical/Emotional Findings: - Physical Exam.  General: Obese, chronic 

ill-looking, in no apparent distress,.  HEENT: Normocephalic, atraumatic, 

nonicteric sclera, nonanemic conjunctive.  Neck: Supple,  without JVD or goiter 

or thyroid mass.  Respiratory: Normal breathing effort, clear to auscultation 

bilaterally, no crackles no wheezing or rhonchi.  Cardiovascular: Regular rate 

and rhythm, S1, S2 normal, no murmur no gallop.  Gastrointestinal: Normal bowel 

sounds, nondistended, nontender, No ascites, , No masses, no hepatosplenomegaly.

  Extremities l: No clubbing, +2/+2 peripheral edema, full range of motion, no 

deformity, no muscle atrophy.  Integumentary: No rashes, petechia, suspected 

lesions.  Lymphatics: No axilla or cervical lymphadenopathy.  Neurology;   alert

 awake oriented x2, no focal neurologic deficit,





- Studies


Laboratory Data (last 24 hrs)











  12/17/24 12/17/24 12/17/24





  14:10 14:10 14:10


 


WBC   5.60 


 


Hgb   9.6 L 


 


Hct   30.7 L 


 


Plt Count   263 


 


PT  16.0 H  


 


INR  1.44  


 


Sodium    133 L


 


Potassium    4.4


 


BUN    56 H


 


Creatinine    2.53 H


 


Glucose    136 H


 


Magnesium    2.9 H


 


Total Bilirubin    0.4


 


AST    23


 


ALT    21


 


Alkaline Phosphatase    111








Imagings Data: 


Chest x-ray on admission personally reviewed chest x-ray pleural effusion, mild 

pulmonary congestion


Twelve-lead EKG on admission personally reviewed normal sinus rhythm with heart 

rate 60/min





Assessment and Plan





- Plan


This is 78 years old female patient with complex medical problem including 

advanced dementia, ESRD on hemodialysis, atrial fibrillation on apixaban, heart 

failure, multifactorial anemia who was recently admitted and discharged to 

nursing home.  Patient was found to have bradycardia asymptomatic with heart 

rate 50 at her dialysis center and transferred to hospital for further 

evaluation





#1 asymptomatic bradycardia


#2 history of paroxysmal A-fib on apixaban


#3 ESRD on hemodialysis


#4 anemia of chronic disease in iron deficiency anemia





I will hold amiodarone, continue telemetry monitor, no urgent hemodialysis 

indicated at the moment, supplemental oxygen, nephrology and cardiology has been

 called.





- Advance Directives


Does patient have a Living Will: No


Does patient have a Durable POA for Healthcare: No

## 2024-12-17 NOTE — P.HP
Certification for Inpatient


Patient admitted to: Observation


With expected LOS: <2 Midnights


Practitioner: I am a practitioner with admitting privileges, knowledge of 

patient current condition, hospital course, and medical plan of care.


Services: Services provided to patient in accordance with Admission requirements

found in Title 42 Section 412.3 of the Code of Federal Regulations





Patient History


Date of Service: 12/17/24


Allergies





Sulfa (Sulfonamide Antibiotics) [Sulfa(Sulfonamide Antibiotics)] Allergy 

(Intermediate, Verified 09/21/20 15:05)


   unknown


artificial sweetners Allergy (Uncoded 09/21/20 15:05)


   Anaphylaxis





Home Medications: 








Atorvastatin Calcium [Lipitor] 40 mg PO BEDTIME 04/08/18 


Levothyroxine Sodium [Unithroid] 37.5 mcg PO DAILY 11/23/24 


Memantine HCl 5 mg PO BID 11/23/24 


Albuterol Neb [Proventil 0.083% Neb Soln] 2.5 mg NEB [id number] PRN  amp 12/12/24 


Amiodarone HCl [Cordarone*] 200 mg PO DAILY #30 tab 12/12/24 


Amlodipine [Norvasc*] 10 mg PO DAILY #30 tab 12/12/24 


Apixaban [Eliquis *] 2.5 mg PO BID #60 tab 12/12/24 


Arformoterol Tartrate [Brovana] 15 mcg NEB BIDRESP  vial.neb 12/12/24 


Bumetanide [Bumex*] 2 mg PO BID #120 tab 12/12/24 


Calcitrol [Rocaltrol*] 0.5 mcg PO DAILY  cap 12/12/24 


Hydralazine HCl 10 mg PO TID #90 tab 12/12/24 


Hydrocortisone Cream [Hydrocortisone 1% Cream*] 1 appl TOP TID PRN  tube 

12/12/24 


Isosorbide Dinit [Isordil*] 20 mg PO BID  tab 12/12/24 








- Past Medical/Surgical History


Diabetic: No


-: SVT


-: chronic pain syndrome


-: dyslipidemia


-: hypothyroidism


-: A-fib


-: Heart failure with unknown EF


-: Dementia


-: Osteoporosis


-: bupivicain pain pump


Psychosocial/ Personal History: Lives at home with her .  Has dementia.  

Denies having home O2.





- Family History


  ** Father


-: Heart disease





  ** Mother


-: Heart disease





  ** Sister


-: Cancer





- Social History


Alcohol use: No


CD- Drugs: No


Caffeine use: No





Physical Examination





- Studies


Laboratory Data (last 24 hrs)











  12/17/24 12/17/24 12/17/24





  14:10 14:10 14:10


 


WBC   5.60 


 


Hgb   9.6 L 


 


Hct   30.7 L 


 


Plt Count   263 


 


PT  16.0 H  


 


INR  1.44  


 


Sodium    133 L


 


Potassium    4.4


 


BUN    56 H


 


Creatinine    2.53 H


 


Glucose    136 H


 


Magnesium    2.9 H


 


Total Bilirubin    0.4


 


AST    23


 


ALT    21


 


Alkaline Phosphatase    111











Assessment and Plan





- Advance Directives


Does patient have a Living Will: No


Does patient have a Durable POA for Healthcare: No

## 2024-12-17 NOTE — P.DS
Discharge Date: 12/12/24


Primary Care Provider: sees Dr. Schrader, Dr. Martinez, Dr. Maynard


Disposition: TRANSFER TO NURSING HOME


Discharge Condition: GOOD


Reason for Admission: SOB


Brief History of Present Illness: 








Ms. Martinez is a 78-year-old female with a past medical history of 

hypertension, hyperlipidemia, A-fib on long-term anticoagulation (Eliquis), 

COPD, assuming CKI with KINGA superimposed, hypothyroidism, dementia, osteopor

osis, and chronic pain.  She presented to the emergency department for wheezing 

and was noted to have acute anemia.  Ms. Martinez has not been seen at this 

facility in about 3 years or so unsure of her kidney function/echo/normal H&H.  

She gives a history of seeing Dr. Martinez, Dr. Maynard, and Dr. Schrader.  At 

bedside she is alert but confused, pale, with multiple areas to her extremities 

with pressure bandages from bleeding bright red blood.  Her  states that 

she scratches herself during the evening and he is attempted to cover her skin 

but she awakes with the sheets wet with blood.  She denies hematuria, melena, 

hematochezia, any nausea or vomiting, or hemoptysis.  Vital signs at this time 

are 198/62, 98% on 2 L via nasal cannula, heart rate 51. 


Her medications are consistent with hypertension, A-fib, congestive failure, 

CKD, COPD, dementia, and hypothyroidism.  They have typed and screened and 

ordered 2 units of packed red blood cells in the emergency department. 


Hospital Course: 





Patient was started on hemodialysis and patient is doing much better.  Patient's

cardiology input was appreciated.  Patient diuresed and patient's volume status 

has improved.  Patient overall is doing much better.  Continuing with medication

for rate control.  DC'd beta-blocker therapy and will continue with amiodarone. 

Continue with anticoagulation.  Arranging for discharge to skilled nursing 

facility.  Patient is stable for discharge once accepted.


Vital Signs/Physical Exam: 














Temp Pulse Resp BP Pulse Ox


 


 97.9 F   44 L  18   119/58 L  96 


 


 12/12/24 16:00  12/12/24 16:00  12/12/24 16:00  12/12/24 16:00  12/12/24 16:00








General: Alert, In no apparent distress, Oriented x3


Other Physical/Emotional Findings: - Physical Exam.  General:  Not acutely ill 

looking, in no apparent distress,.  HEENT: Normocephalic, atraumatic,.  Neck: 

Supple,  without JVD or goiter or thyroid mass.  Respiratory: Normal breathing 

effort, clear to auscultation bilaterally, no crackles no wheezing or rhonchi.  

Cardiovascular: Regular rate and rhythm, S1, S2 normal, no murmur no gallop.  

Gastrointestinal: Normal bowel sounds, nondistended, nontender, No ascites, , No

masses, no hepatosplenomegaly.  Musculoskeletal: No clubbing, No peripheral 

edema.  Integumentary: Multiple ecchymosis on both lower extremity, right arm in

elastic dressing, no sign of bleeding.  Lymphatics: No axilla or cervical 

lymphadenopathy.  Neurology;   alert awake oriented x1, no focal neurologic 

deficit


Laboratory Data at Discharge: 














WBC  5.90 thou/uL (4.3-10.9)   12/11/24  09:00    


 


Hgb  8.8 g/dL (12.0-15.0)  L  12/11/24  09:00    


 


Hct  27.9 % (36.0-45.0)  L  12/11/24  09:00    


 


Plt Count  179 thou/uL (152-406)   12/11/24  09:00    


 


PT  15.3 SECONDS (9.4-12.5)  H  11/27/24  05:00    


 


INR  1.38   11/27/24  05:00    


 


APTT  35.8 SECONDS (24.3-36.9)   11/24/24  05:11    


 


Sodium  133 mEq/L (136-145)  L  12/12/24  06:43    


 


Potassium  3.8 mEq/L (3.5-5.1)   12/12/24  06:43    


 


BUN  31 mg/dL (7-18)  H  12/12/24  06:43    


 


Creatinine  1.81 mg/dL (0.55-1.02)  H  12/12/24  06:43    


 


Glucose  90 mg/dL ()   12/12/24  06:43    


 


Uric Acid  8.0 mg/dL (2.6-6.0)  H  11/27/24  05:00    


 


Phosphorus  3.9 mg/dL (2.5-4.9)   12/12/24  06:43    


 


Magnesium  2.1 mg/dL (1.6-2.4)   12/03/24  06:27    


 


Total Bilirubin  0.5 mg/dL (0.2-1.0)   12/11/24  09:00    


 


AST  24 U/L (15-37)   12/11/24  09:00    


 


ALT  22 U/L (13-56)   12/11/24  09:00    


 


Alkaline Phosphatase  94 U/L ()   12/11/24  09:00    








Home Medications: 








Atorvastatin Calcium [Lipitor] 40 mg PO BEDTIME 04/08/18 


Levothyroxine Sodium [Unithroid] 37.5 mcg PO DAILY 11/23/24 


Memantine HCl 5 mg PO BID 11/23/24 


Albuterol Neb [Proventil 0.083% Neb Soln] 2.5 mg NEB [id number] PRN  amp 12/12/24 


Amiodarone HCl [Cordarone*] 200 mg PO DAILY #30 tab 12/12/24 


Amlodipine [Norvasc*] 10 mg PO DAILY #30 tab 12/12/24 


Apixaban [Eliquis *] 2.5 mg PO BID #60 tab 12/12/24 


Arformoterol Tartrate [Brovana] 15 mcg NEB BIDRESP  vial.neb 12/12/24 


Bumetanide [Bumex*] 2 mg PO BID #120 tab 12/12/24 


Calcitrol [Rocaltrol*] 0.5 mcg PO DAILY  cap 12/12/24 


Hydralazine HCl 10 mg PO TID #90 tab 12/12/24 


Hydrocortisone Cream [Hydrocortisone 1% Cream*] 1 appl TOP TID PRN  tube 1

2/12/24 


Isosorbide Dinit [Isordil*] 20 mg PO BID  tab 12/12/24 





New Medications: 


Bumetanide [Bumex*] 2 mg PO BID #120 tab


Amiodarone HCl [Cordarone*] 200 mg PO DAILY #30 tab


Apixaban [Eliquis *] 2.5 mg PO BID #60 tab


Hydralazine HCl 10 mg PO TID #90 tab


Amlodipine [Norvasc*] 10 mg PO DAILY #30 tab


Physician Discharge Instructions: 








Hospital course:


Ms. Olga Martinez was initially admitted for acute blood loss anemia, iron 

deficiency, anemia of chronic disease with CKD that progressed to ESRD with the 

initiation of hemodialysis.  She has a history of atrial fibrillation and is on 

apixaban.  Her medical history also includes advanced dementia, chronic pain 

with a pain pump, and she is very hard of hearing.  She has been receiving 

dialysis Tuesday Thursday and Saturdays and was initially going to be sent home 

with skilled nursing, PT, and outpatient dialysis.  She has not been cooperative

 with PT and her spouse states he is unable to get her to dialysis 3 days weekly

 and cannot care for her at home.  She selected [facility] for SNF 

placement and they have arranged chair time at [facility] and set up 

transport.  She is clinically ready for discharge. 





Assessment:


ESRD with HD Tuesday Thursday Saturday


A-fib on chronic anticoagulation


Dementia


Chronic pain


Essential hypertension





She will continue current regimen with HD 3 times weekly


Albuterol nebs every 6 as needed


Brovana 15 mcg twice daily apixaban 2.5 mg p.o. twice daily


Amiodarone 200 mg p.o. daily


Norvasc 10 mg p.o. daily


Atorvastatin 40 mg p.o. nightly


Hydralazine 10 mg p.o. TID  as needed systolic greater than 160


Isosorbide 20 mg p.o. twice daily


Bumex 2 mg p.o. twice daily


Levothyroxine 37.5 mcg p.o. daily


Lactulose 10 g p.o. daily as needed constipation


Hydrocodone 7.5/acetaminophen 325 p.o. every 6 hours as needed pain


Hydrocortisone cream apply topically to affected area 3 times daily as needed


Calcitrol 0.5 mcg p.o. daily





GOAL: Clear understanding of disease process





Diet: Renal





Activity: Fall precautions





INSTRUCTIONS:


Physician Discharge Instructions: 


Okay to DC IV and DC to SNF


Follow-up with primary care provider in 1 to 2 weeks


Follow-up with nephrology, Dr. Martinez in 1 to 2-weeks


Follow-up with Cardiology in 1 to 2 weeks


Please call Dr. Orta at [phone] if any questions regarding hospital stay


Please call nursing station at [phone] if any nursing or medication 

questions


Return to the emergency room if symptoms worsen


Diet: Renal


Activity: Fall precautions


Followup: 


Moses Nicholson MD [Primary Care Provider] - 


Time spent managing pt's care (in minutes): 35

## 2024-12-18 NOTE — P.PN
Subjective


Date of Service: 12/18/24


Chief Complaint: Bradycardia at her dialysis center 


Subjective: No new changes


No overnight event, patient did dialyze with monitor last night.  She has no 

complaint this morning, eating breakfast in bed comfortably.  Denied any 

dizziness or chest pain or syncopal attack.








Review of Systems


Other: 


Consitutional; fever(-), chills (-), rigor(-), night sweat(-), unintentional 

weight loss(-)


HEENT; diplopia  (-), rhinorrhea (-), epistaxis (-), otorrhea (-), otalgia (-)


Respiratory; shortness of breath  (-), wheezing (-), cough (-), sputum (-), 

pleuritic chest pain (-)


Cardiovascular; chest pain  (-), peripheral edema (-), paroxysmal nocturnal 

dyspnea (-), orthopnea (-)


Gastrointestinal; nausea (-), vomiting (-), abdominal pain (-), diarrhea  (-), 

constipation  (-), melena (-), hematochezia  (-)


Urinary; urinary frequency  (-), dysuria  (-), urgency  (-), flank pain  (-), 

gross hematuria  (-), incontinence  (-)


Skin; rash (-), pruritus (-)


CNS; headache (-), paresthesia (-), numbness (-), paralysis (-)








Physical Examination





- Vital Signs


Temperature: 97.7 F


Blood Pressure: 185/77


Pulse: 69


Respirations: 18


Pulse Ox (%): 94





- Physical Exam


Other Physical/Emotional Findings: - Physical Exam.  General: Obese, chronic 

ill-looking, in no apparent distress,.  HEENT: Normocephalic, atraumatic, 

nonicteric sclera, nonanemic conjunctive.  Neck: Supple,  without JVD or goiter 

or thyroid mass.  Respiratory: Normal breathing effort, clear to auscultation 

bilaterally, no crackles no wheezing or rhonchi, tunneled dialysis catheter in 

the right upper chest.  Cardiovascular: Regular rate and rhythm, S1, S2 normal, 

no murmur no gallop.  Gastrointestinal: Normal bowel sounds, nondistended, 

nontender, No ascites, , No masses, no hepatosplenomegaly.  Extremities l: No 

clubbing, +2/+2 peripheral edema, full range of motion, no deformity, no muscle 

atrophy.  Integumentary: No rashes, petechia, suspected lesions.  Lymphatics: No

axilla or cervical lymphadenopathy.  Neurology;   alert awake oriented x2, no f

ocal neurologic deficit,





- Studies


Laboratory Data (last 24 hrs)











  12/17/24 12/17/24 12/17/24





  14:10 14:10 14:10


 


WBC   5.60 


 


Hgb   9.6 L 


 


Hct   30.7 L 


 


Plt Count   263 


 


PT  16.0 H  


 


INR  1.44  


 


Sodium    133 L


 


Potassium    4.4


 


BUN    56 H


 


Creatinine    2.53 H


 


Glucose    136 H


 


Magnesium    2.9 H


 


Total Bilirubin    0.4


 


AST    23


 


ALT    21


 


Alkaline Phosphatase    111











Assessment And Plan





- Plan


This is 78 years old female patient with complex medical problem including 

advanced dementia, ESRD on hemodialysis, atrial fibrillation on apixaban, heart 

failure, multifactorial anemia who was recently admitted and discharged to 

nursing home.  Patient was found to have bradycardia asymptomatic with heart 

rate 50 at her dialysis center and transferred to hospital for further 

evaluation





#1 asymptomatic bradycardia


Heart rate 40 to 70s overnight, continue telemetry monitor, amiodarone on hold, 

cardiology consulted for need for pacemaker





#2 history of paroxysmal A-fib on apixaban


Normotensive, continue apixaban





#3 hypertension


Blood pressure is reasonably controlled, I will continue amlodipine and 

hydralazine at current dose.





#3 ESRD on hemodialysis


Inpatient hemodialysis as scheduled





#4 anemia of chronic disease and iron deficiency anemia


Hemoglobin stable at 9.1, no clinical bleeding





DVT pro axis apixaban


Disposition; plan to discharge back to nursing home in a few days.

## 2024-12-18 NOTE — PN
Date of Progress Note:  12/18/2024



Subjective:  The patient was admitted to the hospital with AFib with bradycardia.  The patient missed
 dialysis yesterday. 



DICTATION ENDS HERE.





MILLIE

DD:  12/18/2024 10:46:26Voice ID:  [id number]

DT:  12/18/2024 11:09:39Report ID:  [id number]

## 2024-12-18 NOTE — CON
Date of Consultation:  12/18/2024



Reason For Consultation:  Elevated BUN and creatinine, electrolyte imbalance, 
dialysis management.



History Of Present Illness:  This is a 78-year-old female with significant past 
medical history of hypertension, hyperlipidemia, chronic kidney disease with 
acute kidney injury secondary to cardiorenal, recently started on dialysis last 
month, cardiac arrhythmia, AFib, hypothyroidism, the patient was initiated on 
dialysis.  The patient went to dialysis yesterday, found to have bradycardia.  
For that reason, patient was referred to the hospital.  In the hospital, found 
rate of 60, asymptomatic.



Past Medical History:  Includes:

1.   Hypertension.

2.   Hyperlipidemia.

3.   Chronic kidney disease, baseline creatinine 2.1.  The patient had acute 
kidney injury, initiated on dialysis recently.

4.   AFib.

5.   Dementia.

6.   Osteoporosis.



Home Medications:  Includes Namenda, atorvastatin, levothyroxine, amlodipine, 
amiodarone, Bumex, calcitriol, hydralazine, hydrocortisone, isosorbide.



Allergies:  TO SULFA AND ARTIFICIAL TEARS.



Past Surgical History:  Includes TDC placement and subclavian pain pump.



Family History:  Positive for CAD and hypertension.



Social History:  Denied smoking.  Denied drinking.  Denied drugs abuse.



Review of Systems:

Head and Neck:  No red eye.  No ear pain. 

GI:  No nausea.  No vomiting. 

:  No polyuria.  No dysuria.  No hematuria. 

GYN:  No vaginal discharge. 

Respiratory:  No shortness of breath. 

Cardiovascular:  Has bradycardia.  No symptoms.  No chest pain. 

Endocrine:  No polydipsia. 

Skin:  No rash. 

Neuro:  Has weakness and fatigue.



Physical Examination:

Vital Signs:  Blood pressure of 137/47, pulse of 40.  Afebrile. 

Chest:  Clear to auscultation. 

Heart:  S1, S2.  Systolic murmur. 

Abdomen:  Soft, nontender. 

Extremities:  No edema. 

Neurologic:  Alert.  No focality.



Laboratory Data:  WBC 5.3, hemoglobin 9.1, sodium 134, potassium 4.3, bicarb 27,
BUN 56, creatinine 2.1, calcium 8.1.



Assessment And Plan:  

1.   Acute kidney injury, dialysis dependent, missed dialysis yesterday.  We 
will resume dialysis today and we will follow up.

2.   Hypertension, controlled.  We will follow up the blood pressure post 
dialysis.

3.   Bradycardia, as by Cardiology.  The patient is asymptomatic.

4.   Anemia of chronic kidney disease.  Resume SUJATAH.

5.   Hyponatremia, will be corrected with dialysis. 



The patient cleared from the Renal standpoint for discharge planning after 
dialysis.



Time spent examining the patient face-to-face reviewing data lab and radiology 
placing order discussing the case with the patient / family by bedside 
discussing the case with the team member including hospitalist and nursing staff
more than 75-minute

MILLIE

DD:  12/18/2024 10:51:44   Voice ID:  [id number]

DT:  12/18/2024 11:53:49   Report ID:  [id number]

[email or web address]

## 2024-12-19 NOTE — P.PN
Date of Service: 12/19/24


Subjective


Date of Service: 12/18/24


Chief Complaint: Bradycardia at her dialysis center 


Subjective: No new changes


No overnight event.  She has complaints of back pain this morning, eating 

breakfast in bed comfortably.  Denied any dizziness or chest pain or syncopal 

attack.





Review of Systems


Other: 


Consitutional; fever(-), chills (-), rigor(-), night sweat(-), unintentional 

weight loss(-)


HEENT; diplopia  (-), rhinorrhea (-), epistaxis (-), otorrhea (-), otalgia (-)


Respiratory; shortness of breath  (-), wheezing (-), cough (-), sputum (-), 

pleuritic chest pain (-)


Cardiovascular; chest pain  (-), peripheral edema (-), paroxysmal nocturnal 

dyspnea (-), orthopnea (-)


Gastrointestinal; nausea (-), vomiting (-), abdominal pain (-), diarrhea  (-), 

constipation  (-), melena (-), hematochezia  (-)


Urinary; urinary frequency  (-), dysuria  (-), urgency  (-), flank pain  (-), 

gross hematuria  (-), incontinence  (-)


Skin; rash (-), pruritus (-)


CNS; headache (-), paresthesia (-), numbness (-), paralysis (-)








Physical Examination





- Vital Signs


reviewed





- Physical Exam


Other Physical/Emotional Findings: - Physical Exam.  General: Obese, chronic 

ill-looking, in no apparent distress,.  HEENT: Normocephalic, atraumatic, 

nonicteric sclera, nonanemic conjunctive.  Neck: Supple,  without JVD or goiter 

or thyroid mass.  Respiratory: Normal breathing effort, clear to auscultation 

bilaterally, no crackles no wheezing or rhonchi, tunneled dialysis catheter in 

the right upper chest.  Cardiovascular: Regular rate and rhythm, S1, S2 normal, 

no murmur no gallop.  Gastrointestinal: Normal bowel sounds, nondistended, 

nontender, No ascites, , No masses, no hepatosplenomegaly.  Extremities l: No 

clubbing, +2/+2 peripheral edema, full range of motion, no deformity, no muscle 

atrophy.  Integumentary: No rashes, petechia, suspected lesions.  Lymphatics: No

axilla or cervical lymphadenopathy.  Neurology;   alert awake oriented x2, no 

focal neurologic deficit,





- Studies


Laboratory Data (last 24 hrs)











  12/17/24 12/17/24 12/17/24





  14:10 14:10 14:10


 


WBC   5.60 


 


Hgb   9.6 L 


 


Hct   30.7 L 


 


Plt Count   263 


 


PT  16.0 H  


 


INR  1.44  


 


Sodium    133 L


 


Potassium    4.4


 


BUN    56 H


 


Creatinine    2.53 H


 


Glucose    136 H


 


Magnesium    2.9 H


 


Total Bilirubin    0.4


 


AST    23


 


ALT    21


 


Alkaline Phosphatase    111











Assessment And Plan





- Plan


This is 78 years old female patient with complex medical problem including 

advanced dementia, ESRD on hemodialysis, atrial fibrillation on apixaban, heart 

failure, multifactorial anemia who was recently admitted and discharged to 

nursing home.  Patient was found to have bradycardia asymptomatic with heart 

rate 50 at her dialysis center and transferred to hospital for further 

evaluation





#1 asymptomatic bradycardia


Heart rate 40 to 70s overnight, continue telemetry monitor, amiodarone on hold, 

cardiology consulted for need for pacemaker


pt with stable heart rate throughout today





#2 history of paroxysmal A-fib on apixaban


Normotensive, continue apixaban





#3 hypertension


Blood pressure is reasonably controlled, I will continue amlodipine and 

hydralazine at current dose.





#3 ESRD on hemodialysis


Inpatient hemodialysis as scheduled





#4 anemia of chronic disease and iron deficiency anemia


Hemoglobin stable at 9.1, no clinical bleeding





DVT pro axis apixaban


Disposition; plan to discharge back to nursing home in a few days. Will need 

reauth so PT consulted 12/19/24.

## 2024-12-19 NOTE — EKG
Test Date:    2024-12-17               Test Time:    13:03:55

Technician:   VIRGILIO                                    

                                                     

MEASUREMENT RESULTS:                                       

Intervals:                                           

Rate:         60                                     

NE:           166                                    

QRSD:         102                                    

QT:           472                                    

QTc:          472                                    

Axis:                                                

P:            -88                                    

NE:           166                                    

QRS:          -34                                    

T:            27                                     

                                                     

INTERPRETIVE STATEMENTS:                                       

                                                     

Normal sinus rhythm

Left axis deviation

Abnormal ECG

Compared to ECG 11/23/2024 20:43:02

Left-axis deviation now present

Sinus bradycardia no longer present

Sinus arrhythmia no longer present

Prolonged QT interval no longer present



Electronically Signed On 12-19-24 11:30:03 CST by Ken Delgado

## 2024-12-20 NOTE — PN
Subjective:  No overnight events.  Seen and examined during dialysis today.  The patient can be disch
arged today from Nephrology point of view.



Objective:  Vital Signs:  Temperature 97.8, pulse rate 61, blood pressure 180/74. 

General:  Awake, alert.  Looks chronically ill. 

Neck:  Supple.  No elevated JVD. 

Heart:  Regular rate and rhythm.  Normal S1, S2. 

Chest:  Clear to auscultation bilaterally.  No rales or wheezes. 

Abdomen:  Soft, nontender. 

Extremities:  No edema.



Laboratory Data:  White count 5.3, hemoglobin 9.1.  Sodium 134, creatinine 2.1.



Assessment And Plan:  

1.End-stage renal disease.  Continue dialysis Monday, Wednesday, and Friday.  Renal dose medication.


2.Anemia of chronic disease.  Can resume Epogen as an outpatient.

3.Mild hyponatremia.  We will correct with dialysis.

4.Bradycardia.  Heart rate improved now.  No heart blocks.  Cardiology consult appreciated.  No furt
her intervention at this time. 



Thanks for allowing me to participate in the patient's care.  Total time spent 55 minutes including d
ocumentation, reviewing labs, and placing orders.





THERESA

DD:  12/20/2024 16:59:08Voice ID:  [id number]

DT:  12/20/2024 20:55:27Report ID:  [id number]

## 2024-12-20 NOTE — P.CNS
Date of Consult: 12/20/24


Chief Complaint: Bradycardia at her dialysis center 


History of Present Illness: 





Patient with PMH of questionable AF, HTN, ESRD on HD presented as an admission 

from dialysis center due to bradycardia, patient follows up with cardiology in 

[facility], denies chest pain, no syncope but report dizzy spells, no other 

complains except for generalized body aches.


Allergies





Sulfa (Sulfonamide Antibiotics) [Sulfa(Sulfonamide Antibiotics)] Allergy 

(Intermediate, Verified 09/21/20 15:05)


   unknown


artificial sweetners Allergy (Uncoded 09/21/20 15:05)


   Anaphylaxis





Home medications list reviewed: Yes


Home Medications: 








Atorvastatin Calcium [Lipitor] 40 mg PO BEDTIME 04/08/18 


Levothyroxine Sodium [Unithroid] 37.5 mcg PO DAILY 11/23/24 


Memantine HCl 5 mg PO BID 11/23/24 


Albuterol Neb [Proventil 0.083% Neb Soln] 2.5 mg NEB [id number] PRN  amp 12/12/24 


Amiodarone HCl [Cordarone*] 200 mg PO DAILY #30 tab 12/12/24 


Amlodipine [Norvasc*] 10 mg PO DAILY #30 tab 12/12/24 


Apixaban [Eliquis *] 2.5 mg PO BID #60 tab 12/12/24 


Arformoterol Tartrate [Brovana] 15 mcg NEB BIDRESP  vial.neb 12/12/24 


Bumetanide [Bumex*] 2 mg PO BID #120 tab 12/12/24 


Calcitrol [Rocaltrol*] 0.5 mcg PO DAILY  cap 12/12/24 


Hydralazine HCl 10 mg PO TID #90 tab 12/12/24 


Hydrocortisone Cream [Hydrocortisone 1% Cream*] 1 appl TOP TID PRN  tube 

12/12/24 


Isosorbide Dinit [Isordil*] 20 mg PO BID  tab 12/12/24 








- Past Medical/Surgical History


Diabetic: No


-: SVT


-: chronic pain syndrome


-: dyslipidemia


-: hypothyroidism


-: A-fib


-: Heart failure with unknown EF


-: Dementia


-: Osteoporosis


-: bupivicain pain pump


Psychosocial/ Personal History: Lives at home with her .  Has dementia.  

Denies having home O2.





- Family History


  ** Father


Medical History: Heart disease





  ** Mother


Medical History: Heart disease





  ** Sister


Medical History: Cancer





- Social History


Smoking Status: Current every day smoker


Alcohol use: Yes


CD- Drugs: No


Caffeine use: Yes





Review of Systems


10-point ROS is otherwise unremarkable





Physical Examination














Temp Pulse Resp BP Pulse Ox


 


 97.8 F   61   20   179/74 H  94 


 


 12/20/24 12:00  12/20/24 12:00  12/20/24 12:00  12/20/24 12:00  12/20/24 12:00








General: Alert, In no apparent distress


HEENT: Atraumatic, PERRLA, Mucous membr. moist/pink, EOMI, Sclerae nonicteric


Neck: Supple, 2+ carotid pulse no bruit, No LAD, Without JVD or thyroid 

abnormality


Respiratory: Clear to auscultation bilaterally, Normal air movement


Cardiovascular: Regular rate/rhythm, Normal S1 S2


Gastrointestinal: Normal bowel sounds, No tenderness


Musculoskeletal: No tenderness


Integumentary: No rashes


Neurological: Normal gait, Normal speech, Normal tone, Normal affect


Lymphatics: No axilla or inguinal lymphadenopathy





- Problems


(1) Bradycardia


Current Visit: Yes   Status: Acute   


Plan: 


Patient tele was reviewed and she is in sinus rhythm with no significant pauses,

 chart mention history of atrial fibrillation, but family says they were not 

told about it before and she is on Eliquis because she had a pain pump catheter 

and they were worried about clotting with it and thats why she was palced on 

eliquis and it was even reduced recently to once a day pill.


patient usually follow up with cardiologist in [facility] and had a recent 7 days 

event monitor but they did not follow up on results.


continue to monitor patient on telemetery


hold AVN blocking agents


Patient need to follow up with her cardiologist as outpatient.








(2) Chronic diastolic heart failure


Current Visit: Yes   Status: Acute   


Plan: 


looks euvolemic on exam, continue current medications.


she is on Bumex and volume adjustment through Nephrology.








(3) Hypertension


Current Visit: No   Status: Chronic   


Plan: 


continue current medications


Qualifiers: 


   Hypertension type: essential hypertension

## 2024-12-20 NOTE — P.PN
Date of Service: 12/20/24


Subjective


Date of Service: 12/18/24


Chief Complaint: Bradycardia at her dialysis center 


Subjective: No new changes


No overnight event. Night RN states pt was awake, alert, ox3 all night. She is 

presently snoring softly in no distress.  Lowest HR since 12/19/24 morning has 

been 52.





Review of Systems


Other: 


Consitutional; fever(-), chills (-), rigor(-), night sweat(-), unintentional 

weight loss(-)


HEENT; diplopia  (-), rhinorrhea (-), epistaxis (-), otorrhea (-), otalgia (-)


Respiratory; shortness of breath  (-), wheezing (-), cough (-), sputum (-), 

pleuritic chest pain (-)


Cardiovascular; chest pain  (-), peripheral edema (-), paroxysmal nocturnal 

dyspnea (-), orthopnea (-)


Gastrointestinal; nausea (-), vomiting (-), abdominal pain (-), diarrhea  (-), 

constipation  (-), melena (-), hematochezia  (-)


Urinary; urinary frequency  (-), dysuria  (-), urgency  (-), flank pain  (-), 

gross hematuria  (-), incontinence  (-)


Skin; rash (-), pruritus (-)


CNS; headache (-), paresthesia (-), numbness (-), paralysis (-)








Physical Examination





- Vital Signs


reviewed





- Physical Exam


Other Physical/Emotional Findings: - Physical Exam.  General: Obese, chronic 

ill-looking, in no apparent distress,.  HEENT: Normocephalic, atraumatic, 

nonicteric sclera, nonanemic conjunctive.  Neck: Supple,  without JVD or goiter 

or thyroid mass.  Respiratory: Normal breathing effort, clear to auscultation 

bilaterally, no crackles no wheezing or rhonchi, tunneled dialysis catheter in 

the right upper chest.  Cardiovascular: Regular rate and rhythm, S1, S2 normal, 

no murmur no gallop.  Gastrointestinal: Normal bowel sounds, nondistended, 

nontender, No ascites, , No masses, no hepatosplenomegaly.  Extremities l: No 

clubbing, +2/+2 peripheral edema upper and lower, full range of motion, no 

deformity, no muscle atrophy.  Integumentary: No rashes, petechia, suspected 

lesions.  Lymphatics: No axilla or cervical lymphadenopathy.  Neurology;   alert

awake oriented x2, no focal neurologic deficit,





- Studies


Laboratory Data (last 24 hrs)











  12/17/24 12/17/24 12/17/24





  14:10 14:10 14:10


 


WBC   5.60 


 


Hgb   9.6 L 


 


Hct   30.7 L 


 


Plt Count   263 


 


PT  16.0 H  


 


INR  1.44  


 


Sodium    133 L


 


Potassium    4.4


 


BUN    56 H


 


Creatinine    2.53 H


 


Glucose    136 H


 


Magnesium    2.9 H


 


Total Bilirubin    0.4


 


AST    23


 


ALT    21


 


Alkaline Phosphatase    111











Assessment And Plan





- Plan


This is 78 years old female patient with complex medical problem including 

advanced dementia, ESRD on hemodialysis, atrial fibrillation on apixaban, heart 

failure, multifactorial anemia who was recently admitted and discharged to 

nursing home.  Patient was found to have bradycardia asymptomatic with heart 

rate 50 at her dialysis center and transferred to hospital for further 

evaluation





#1 asymptomatic bradycardia


Heart rate 52 to 70s overnight, continue telemetry monitor, amiodarone on hold, 

cardiology consulted for need for pacemaker


pt with stable heart rate throughout today





#2 history of paroxysmal A-fib on apixaban


Normotensive, continue apixaban





#3 hypertension


Blood pressure is reasonably controlled, I will continue amlodipine and 

hydralazine at current dose.





#3 ESRD on hemodialysis


Inpatient hemodialysis as scheduled





#4 anemia of chronic disease and iron deficiency anemia


Hemoglobin stable at 9.1, no clinical bleeding





DVT pro axis apixaban


Disposition; plan to discharge back to nursing home in a few days. Will need 

reauth so PT consulted 12/19/24.

## 2024-12-20 NOTE — P.DS
Admission Date: 12/17/24


Discharge Date: 12/20/24


Disposition: TRANSFER TO NURSING HOME


Reason for Admission: Bradycardia at her dialysis center 


Consultations: 





Dr. Danny Martinez


Brief History of Present Illness: 


This is 78 years old female patient with a complex medical problem including A-

fib on apixaban, congestive heart failure, ESRD on hemodialysis, dementia, 

anemia of iron deficiency and acute blood loss who recently discharged from 

hospital to nursing home and then he was found to have bradycardia with heart 

rate in the 50s when she presented to the dialysis center for scheduled 

hemodialysis.  She did not get dialyzed and brought back to the emergency room 

for evaluation.





Her heart rate was 60/min, twelve-lead EKG shows normal sinus rhythm, patient 

was given 1 dose of calcium gluconate and being admitted on general medical 

floor.  She denied any presyncopal or syncopal attack.  Per her  her 

heart rate has been low, cardiology has been changing her medication.








Hospital Course: 


Patient's tele was reviewed per Cardiology, Dr. Delgado, and "she is in sinus 

rhythm with no significant pauses, chart mention history of atrial fibrillation,

but family says they were not told about it before and she is on Eliquis because

she had a pain pump catheter and they were worried about clotting with it and 

thats why she was palced on eliquis and it was even reduced recently to once a 

day pill. Patient usually follows up with cardiologist in [place] and had a 

recent 7 days event monitor but they did not follow up on results." We have held

amiodarone and increased patient's hydralazine. Patient needs to follow up with 

her cardiologist (in [place]) as an outpatient.








Vital Signs/Physical Exam: 














Temp Pulse Resp BP Pulse Ox


 


 97.8 F   61   20   179/74 H  94 


 


 12/20/24 12:00  12/20/24 12:00  12/20/24 12:00  12/20/24 12:00  12/20/24 12:00








Other Physical/Emotional Findings: - Physical Exam.  General: Obese, chronic il

l-looking, in no apparent distress,.  HEENT: Normocephalic, atraumatic, 

nonicteric sclera, nonanemic conjunctive.  Neck: Supple,  without JVD or goiter 

or thyroid mass.  Respiratory: Normal breathing effort, clear to auscultation 

bilaterally, no crackles no wheezing or rhonchi, tunneled dialysis catheter in 

the right upper chest.  Cardiovascular: Regular rate and rhythm, S1, S2 normal, 

no murmur no gallop.  Gastrointestinal: Normal bowel sounds, nondistended, 

nontender, No ascites, , No masses, no hepatosplenomegaly.  Extremities l: No 

clubbing, +2/+2 peripheral edema, full range of motion, no deformity, no muscle 

atrophy.  Integumentary: No rashes, petechia, suspected lesions.  Lymphatics: No

axilla or cervical lymphadenopathy.  Neurology;   alert awake oriented x2, no 

focal neurologic deficit,


Laboratory Data at Discharge: 














WBC  5.30 thou/uL (4.3-10.9)   12/18/24  06:03    


 


Hgb  9.1 g/dL (12.0-15.0)  L  12/18/24  06:03    


 


Hct  29.1 % (36.0-45.0)  L  12/18/24  06:03    


 


Plt Count  249 thou/uL (152-406)   12/18/24  06:03    


 


PT  16.0 SECONDS (9.4-12.5)  H  12/17/24  14:10    


 


INR  1.44   12/17/24  14:10    


 


Sodium  134 mEq/L (136-145)  L  12/18/24  06:03    


 


Potassium  4.3 mEq/L (3.5-5.1)   12/18/24  06:03    


 


BUN  56 mg/dL (7-18)  H  12/18/24  06:03    


 


Creatinine  2.16 mg/dL (0.55-1.02)  H  12/18/24  06:03    


 


Glucose  81 mg/dL ()   12/18/24  06:03    


 


Magnesium  2.9 mg/dL (1.6-2.4)  H  12/17/24  14:10    


 


Total Bilirubin  0.4 mg/dL (0.2-1.0)   12/17/24  14:10    


 


AST  23 U/L (15-37)   12/17/24  14:10    


 


ALT  21 U/L (13-56)   12/17/24  14:10    


 


Alkaline Phosphatase  111 U/L ()   12/17/24  14:10    








Home Medications: 








Atorvastatin Calcium [Lipitor] 40 mg PO BEDTIME 04/08/18 


Levothyroxine Sodium [Unithroid] 37.5 mcg PO DAILY 11/23/24 


Memantine HCl 5 mg PO BID 11/23/24 


Albuterol Neb [Proventil 0.083% Neb Soln] 2.5 mg NEB [id number] PRN  amp 12/12/24 


Amlodipine [Norvasc*] 10 mg PO DAILY #30 tab 12/12/24 


Apixaban [Eliquis *] 2.5 mg PO BID #60 tab 12/12/24 


Arformoterol Tartrate [Brovana] 15 mcg NEB BIDRESP  vial.neb 12/12/24 


Bumetanide [Bumex*] 2 mg PO BID #120 tab 12/12/24 


Calcitrol [Rocaltrol*] 0.5 mcg PO DAILY  cap 12/12/24 


Hydrocortisone Cream [Hydrocortisone 1% Cream*] 1 appl TOP TID PRN  tube 

12/12/24 


Isosorbide Dinit [Isordil*] 20 mg PO BID  tab 12/12/24 


Hydralazine [Apresoline*] 50 mg PO TID #90 tab 12/20/24 





New Medications: 


Hydralazine [Apresoline*] 50 mg PO TID #90 tab


Physician Discharge Instructions: 


Patient's tele was reviewed per Cardiology, Dr. Delgado, and "she is in sinus 

rhythm with no significant pauses, chart mention history of atrial fibrillation,

 but family says they were not told about it before and she is on Eliquis 

because she had a pain pump catheter and they were worried about clotting with 

it and thats why she was palced on eliquis and it was even reduced recently to 

once a day pill. Patient usually follows up with cardiologist in [place] and 

had a recent 7 days event monitor but they did not follow up on results." We 

have held amiodarone and increased patient's hydralazine. Patient needs to 

follow up with her cardiologist (in [place]) as an outpatient.








Diet: Renal


Followup: 


Moses Nicholson MD [Primary Care Provider] -

## 2024-12-21 NOTE — P.PN
Date of Service: 12/21/24


Subjective


Date of Service: 12/18/24


Chief Complaint: Bradycardia at her dialysis center 


Subjective: No new changes


No overnight event. Night RN states pt was awake, alert, ox3 all night. She is 

presently snoring softly in no distress.  Lowest HR since 12/19/24 morning has 

been 52.





Review of Systems


Other: 


Consitutional; fever(-), chills (-), rigor(-), night sweat(-), unintentional 

weight loss(-)


HEENT; diplopia  (-), rhinorrhea (-), epistaxis (-), otorrhea (-), otalgia (-)


Respiratory; shortness of breath  (-), wheezing (-), cough (-), sputum (-), 

pleuritic chest pain (-)


Cardiovascular; chest pain  (-), peripheral edema (+), paroxysmal nocturnal 

dyspnea (-), orthopnea (-)


Gastrointestinal; nausea (-), vomiting (-), abdominal pain (-), diarrhea  (-), 

constipation  (-), melena (-), hematochezia  (-)


Urinary; urinary frequency  (-), dysuria  (-), urgency  (-), flank pain  (-), 

gross hematuria  (-), incontinence  (-)


Skin; rash (-), pruritus (-)


CNS; headache (-), paresthesia (-), numbness (-), paralysis (-)








Physical Examination





- Vital Signs


reviewed





- Physical Exam


Other Physical/Emotional Findings: - Physical Exam.  General: Obese, chronic 

ill-looking, in no apparent distress,.  HEENT: Normocephalic, atraumatic, 

nonicteric sclera, nonanemic conjunctive.  Neck: Supple,  without JVD or goiter 

or thyroid mass.  Respiratory: Normal breathing effort, clear to auscultation 

bilaterally, no crackles no wheezing or rhonchi, tunneled dialysis catheter in 

the right upper chest.  Cardiovascular: Regular rate and rhythm, S1, S2 normal, 

no murmur no gallop.  Gastrointestinal: Normal bowel sounds, nondistended, 

nontender, No ascites, , No masses, no hepatosplenomegaly.  Extremities l: No 

clubbing, +2/+2 peripheral edema upper and lower, full range of motion, no 

deformity, no muscle atrophy.  Integumentary: No rashes, petechia, suspected 

lesions.  Lymphatics: No axilla or cervical lymphadenopathy.  Neurology;   alert

awake oriented x2, no focal neurologic deficit,





- Studies


Laboratory Data (last 24 hrs)











  12/17/24 12/17/24 12/17/24





  14:10 14:10 14:10


 


WBC   5.60 


 


Hgb   9.6 L 


 


Hct   30.7 L 


 


Plt Count   263 


 


PT  16.0 H  


 


INR  1.44  


 


Sodium    133 L


 


Potassium    4.4


 


BUN    56 H


 


Creatinine    2.53 H


 


Glucose    136 H


 


Magnesium    2.9 H


 


Total Bilirubin    0.4


 


AST    23


 


ALT    21


 


Alkaline Phosphatase    111











Assessment And Plan





- Plan


This is 78 years old female patient with complex medical problem including 

advanced dementia, ESRD on hemodialysis, atrial fibrillation on apixaban, heart 

failure, multifactorial anemia who was recently admitted and discharged to 

nursing home.  Patient was found to have bradycardia asymptomatic with heart 

rate 50 at her dialysis center and transferred to hospital for further 

evaluation





#1 asymptomatic bradycardia


Heart rate 52 to 70s overnight, continue telemetry monitor, amiodarone on hold, 

cardiology consulted for need for pacemaker


pt with stable heart rate throughout today, stopped amiodarone, no symptomatic 

bradycardia





#2 history of paroxysmal A-fib on apixaban


Normotensive, continue apixaban





#3 hypertension


Blood pressure is reasonably controlled, I will continue amlodipine and  

increase hydralazine current dose.





#3 ESRD on hemodialysis


Inpatient hemodialysis as scheduled 12/20/24 Dialysis (Friday)





#4 anemia of chronic disease and iron deficiency anemia


Hemoglobin stable at 9.1, no clinical bleeding





DVT apixaban


Disposition; plan to discharge back to nursing home. Will need reauth so PT 

consulted 12/19/24. (awaiting auth from [facility])

## 2024-12-22 NOTE — P.PN
Subjective


Date of Service: 12/22/24


Chief Complaint: Bradycardia at her dialysis center 


Subjective: No new changes, No C/O voiced, Tolerating diet, Ambulating, 

Improving





Review of Systems


10-point ROS is otherwise unremarkable





Physical Examination





- Vital Signs


Temperature: 97.6 F


Blood Pressure: 132/63


Pulse: 69


Respirations: 20


Pulse Ox (%): 93





- Physical Exam


General: Alert, In no apparent distress


HEENT: Atraumatic, PERRLA, EOMI


Neck: Supple, JVD not distended


Respiratory: Clear to auscultation bilaterally, Normal air movement


Cardiovascular: Regular rate/rhythm, Normal S1 S2


Gastrointestinal: Normal bowel sounds, No tenderness


Musculoskeletal: No tenderness


Integumentary: No rashes


Neurological: Normal speech, Normal tone, Normal affect


Lymphatics: No axilla or inguinal lymphadenopathy


Other Physical/Emotional Findings: - Physical Exam.  General: Obese, chronic 

ill-looking, in no apparent distress,.  HEENT: Normocephalic, atraumatic, 

nonicteric sclera, nonanemic conjunctive.  Neck: Supple,  without JVD or goiter 

or thyroid mass.  Respiratory: Normal breathing effort, clear to auscultation 

bilaterally, no crackles no wheezing or rhonchi, tunneled dialysis catheter in 

the right upper chest.  Cardiovascular: Regular rate and rhythm, S1, S2 normal, 

no murmur no gallop.  Gastrointestinal: Normal bowel sounds, nondistended, 

nontender, No ascites, , No masses, no hepatosplenomegaly.  Extremities l: No 

clubbing, +2/+2 peripheral edema, full range of motion, no deformity, no muscle 

atrophy.  Integumentary: No rashes, petechia, suspected lesions.  Lymphatics: No

axilla or cervical lymphadenopathy.  Neurology;   alert awake oriented x2, no 

focal neurologic deficit,





- Studies


Medications List Reviewed: Yes





Assessment And Plan





- Current Problems (Diagnosis)


(1) Bradycardia


Current Visit: Yes   Status: Acute   


Plan: 


Patient tele was reviewed and she is in sinus rhythm with no significant pauses,

chart mention history of atrial fibrillation, but family says they were not told

about it before and she is on Eliquis because she had a pain pump catheter and 

they were worried about clotting with it and thats why she was placed on eliquis

and it was even reduced recently to once a day pill.


patient usually follow up with cardiologist in [facility] and had a recent 7 days 

event monitor but they did not follow up on results.


continue to monitor patient on telemetry


hold AVN blocking agents


Patient need to follow up with her cardiologist as outpatient.








(2) Chronic diastolic heart failure


Current Visit: Yes   Status: Acute   


Plan: 


looks euvolemic on exam, continue current medications.


she is on Bumex and volume adjustment through Nephrology.








(3) Hypertension


Current Visit: No   Status: Chronic   


Plan: 


continue current medications


Qualifiers: 


   Hypertension type: essential hypertension

## 2024-12-22 NOTE — P.PN
Date of Service: 12/22/24


Subjective


Date of Service: 12/18/24


Chief Complaint: Bradycardia at her dialysis center 


Subjective: No new changes


No overnight event. no complaints. Continues to repeatedly remove her dialysis 

cath dressing.





Review of Systems


Other: 


Consitutional; fever(-), chills (-), rigor(-), night sweat(-), unintentional 

weight loss(-)


HEENT; diplopia  (-), rhinorrhea (-), epistaxis (-), otorrhea (-), otalgia (-)


Respiratory; shortness of breath  (-), wheezing (-), cough (-), sputum (-), 

pleuritic chest pain (-)


Cardiovascular; chest pain  (-), peripheral edema (+), paroxysmal nocturnal 

dyspnea (-), orthopnea (-)


Gastrointestinal; nausea (-), vomiting (-), abdominal pain (-), diarrhea  (-), 

constipation  (-), melena (-), hematochezia  (-)


Urinary; urinary frequency  (-), dysuria  (-), urgency  (-), flank pain  (-), 

gross hematuria  (-), incontinence  (-)


Skin; rash (-), pruritus (-)


CNS; headache (-), paresthesia (-), numbness (-), paralysis (-)








Physical Examination





- Vital Signs


reviewed





- Physical Exam


Other Physical/Emotional Findings: - Physical Exam.  General: Obese, chronic 

ill-looking, in no apparent distress,.  HEENT: Normocephalic, atraumatic, 

nonicteric sclera, nonanemic conjunctive.  Neck: Supple,  without JVD or goiter 

or thyroid mass.  Respiratory: Normal breathing effort, clear to auscultation 

bilaterally, no crackles no wheezing or rhonchi, tunneled dialysis catheter in 

the right upper chest.  Cardiovascular: Regular rate and rhythm, S1, S2 normal, 

no murmur no gallop.  Gastrointestinal: Normal bowel sounds, nondistended, 

nontender, No ascites, , No masses, no hepatosplenomegaly.  Extremities l: No 

clubbing, +2/+2 peripheral edema upper and lower, full range of motion, no 

deformity, no muscle atrophy.  Integumentary: No rashes, petechia, suspected 

lesions.  Lymphatics: No axilla or cervical lymphadenopathy.  Neurology;   alert

awake oriented x2, no focal neurologic deficit,





- Studies


Laboratory Data (last 24 hrs)











  12/17/24 12/17/24 12/17/24





  14:10 14:10 14:10


 


WBC   5.60 


 


Hgb   9.6 L 


 


Hct   30.7 L 


 


Plt Count   263 


 


PT  16.0 H  


 


INR  1.44  


 


Sodium    133 L


 


Potassium    4.4


 


BUN    56 H


 


Creatinine    2.53 H


 


Glucose    136 H


 


Magnesium    2.9 H


 


Total Bilirubin    0.4


 


AST    23


 


ALT    21


 


Alkaline Phosphatase    111











Assessment And Plan





- Plan


This is 78 years old female patient with complex medical problem including 

advanced dementia, ESRD on hemodialysis, atrial fibrillation on apixaban, heart 

failure, multifactorial anemia who was recently admitted and discharged to 

nursing home.  Patient was found to have bradycardia asymptomatic with heart 

rate 50 at her dialysis center and transferred to hospital for further 

evaluation





#1 asymptomatic bradycardia


Heart rate 52 to 70s overnight, continue telemetry monitor, amiodarone on hold, 

cardiology consulted for need for pacemaker


pt with stable heart rate throughout today, stopped amiodarone, no symptomatic 

bradycardia





#2 history of paroxysmal A-fib on apixaban


Normotensive, continue apixaban





#3 hypertension


Blood pressure is reasonably controlled, I will continue amlodipine and  

increase hydralazine current dose.





#3 ESRD on hemodialysis


Inpatient hemodialysis as scheduled 12/20/24 Dialysis (Friday)





#4 anemia of chronic disease and iron deficiency anemia


Hemoglobin stable at 9.1, no clinical bleeding





DVT apixaban


Disposition; plan to discharge back to nursing home. Will need reauth so PT 

consulted 12/19/24. (awaiting auth from [facility])


pending auth for discharge to SNF 12/22/24

## 2024-12-23 NOTE — P.PN
Subjective


Date of Service: 12/23/24


Chief Complaint: Bradycardia at her dialysis center 


Subjective: No new changes, No C/O voiced, Tolerating diet, Ambulating, 

Improving





Review of Systems


10-point ROS is otherwise unremarkable





Physical Examination





- Vital Signs


Temperature: 98.4 F


Blood Pressure: 167/74


Pulse: 62


Respirations: 18


Pulse Ox (%): 99





- Physical Exam


General: Alert, In no apparent distress


HEENT: Atraumatic, PERRLA, EOMI


Neck: Supple, JVD not distended


Respiratory: Clear to auscultation bilaterally, Normal air movement


Cardiovascular: Regular rate/rhythm, Normal S1 S2


Gastrointestinal: Normal bowel sounds, No tenderness


Musculoskeletal: No tenderness


Integumentary: No rashes


Neurological: Normal speech, Normal tone, Normal affect


Lymphatics: No axilla or inguinal lymphadenopathy


Other Physical/Emotional Findings: - Physical Exam.  General: Obese, chronic 

ill-looking, in no apparent distress,.  HEENT: Normocephalic, atraumatic, 

nonicteric sclera, nonanemic conjunctive.  Neck: Supple,  without JVD or goiter 

or thyroid mass.  Respiratory: Normal breathing effort, clear to auscultation 

bilaterally, no crackles no wheezing or rhonchi, tunneled dialysis catheter in 

the right upper chest.  Cardiovascular: Regular rate and rhythm, S1, S2 normal, 

no murmur no gallop.  Gastrointestinal: Normal bowel sounds, nondistended, 

nontender, No ascites, , No masses, no hepatosplenomegaly.  Extremities l: No 

clubbing, +2/+2 peripheral edema, full range of motion, no deformity, no muscle 

atrophy.  Integumentary: No rashes, petechia, suspected lesions.  Lymphatics: No

axilla or cervical lymphadenopathy.  Neurology;   alert awake oriented x2, no 

focal neurologic deficit,





- Studies


Medications List Reviewed: Yes





Assessment And Plan





- Current Problems (Diagnosis)


(1) Bradycardia


Current Visit: Yes   Status: Acute   


Plan: 


Patient tele was reviewed again and she is in sinus rhythm with no significant 

pauses, chart mention history of atrial fibrillation, but family says they were 

not told about it before and she is on Eliquis because she had a pain pump 

catheter and they were worried about clotting with it and thats why she was 

placed on eliquis and it was even reduced recently to once a day pill.


patient usually follow up with cardiologist in [facility] and had a recent 7 days 

event monitor but they did not follow up on results.


continue to monitor patient on telemetry


hold AVN blocking agents


Patient need to follow up with her cardiologist as outpatient.








(2) Chronic diastolic heart failure


Current Visit: Yes   Status: Acute   


Plan: 


looks euvolemic on exam, continue current medications.


she is on Bumex and volume adjustment through Nephrology.








(3) Hypertension


Current Visit: No   Status: Chronic   


Plan: 


continue current medications


Qualifiers: 


   Hypertension type: essential hypertension

## 2024-12-24 NOTE — PN
Date of Progress Note:  12/24/2024



Subjective:  The patient was admitted to the hospital with acute kidney injury 
and bradycardia.  The patient been in the hospital, no symptoms.



Physical Examination:

Vital Signs:  Blood pressure 156/67, pulse of 68. 

Chest:  Clear to auscultation. 

Heart:  S1, S2.  Regular. 

Abdomen:  Soft, nontender. 

Extremities:  No edema. 

Neurologic:  Alert.  No focality.



Laboratory Data:  Hemoglobin 9.5.  Sodium 136, potassium 3.6, bicarb 29, BUN 21,
creatinine 1.  Calcium 7.5.



Current Medications:  The patient on, includes albuterol, heparin, hydralazine, 
isosorbide, Ambien, Bumex.



Assessment And Plan:  

1.   Acute kidney injury, dialysis dependent.  Kidney function showing some 
improvement.  I am going to continue to monitor kidney function predialysis and 
we will follow up.

2.   Hypertension, controlled, optimal.  Continue current treatment.

3.   Hyponatremia, resolved.  Corrected with dialysis.

4.   Bradycardia, as by Cardiology.



Time spent examining the patient face-to-face reviewing data lab and radiology 
placing order discussing the case with the patient / family by bedside 
discussing the case with the team member including hospitalist and nursing staff
more than 55-minute

MILLIE

DD:  12/24/2024 09:36:06   Voice ID:  [id number]

DT:  12/24/2024 09:57:56   Report ID:  [id number]

VANESSA

## 2024-12-31 NOTE — P.DS
Discharge Date: 12/24/24


Disposition: TRANSFER TO NURSING HOME


Discharge Condition: GOOD


Reason for Admission: Bradycardia at her dialysis center 


Brief History of Present Illness: 





This is 78 years old female patient with a complex medical problem including A-

fib on apixaban, congestive heart failure, ESRD on hemodialysis, dementia, 

anemia of iron deficiency and acute blood loss who recently discharged from 

hospital to nursing home and then he was found to have bradycardia with heart 

rate in the 50s when she presented to the dialysis center for scheduled 

hemodialysis.  She did not get dialyzed and brought back to the emergency room 

for evaluation.





Her heart rate was 60/min, twelve-lead EKG shows normal sinus rhythm, patient 

was given 1 dose of calcium gluconate and being admitted on general medical 

floor.  She denied any presyncopal or syncopal attack.  Per her  her 

heart rate has been low, cardiology has been changing her medication.


Hospital Course: 





Patient's heart rate has stabilized.  Patient is off of any antiarrhythmic.  

Patient will discharge back to nursing home.  Spoke to insurance company and 

patient was approved for continued therapy.  Lower extremity edema has improved.

 Advised patient to keep legs elevated.  At this time patient is stable for 

discharge to [facility].


Vital Signs/Physical Exam: 














Temp Pulse Resp BP Pulse Ox


 


 97.7 F   74   20   141/66 H  93 


 


 12/24/24 16:00  12/24/24 16:00  12/24/24 16:00  12/24/24 16:00  12/24/24 16:00








General: Alert, In no apparent distress, Oriented x3


Other Physical/Emotional Findings: - Physical Exam.  General: Obese, chronic 

ill-looking, in no apparent distress,.  HEENT: Normocephalic, atraumatic, 

nonicteric sclera, nonanemic conjunctive.  Neck: Supple,  without JVD or goiter 

or thyroid mass.  Respiratory: Normal breathing effort, clear to auscultation 

bilaterally, no crackles no wheezing or rhonchi, tunneled dialysis catheter in 

the right upper chest.  Cardiovascular: Regular rate and rhythm, S1, S2 normal, 

no murmur no gallop.  Gastrointestinal: Normal bowel sounds, nondistended, 

nontender, No ascites, , No masses, no hepatosplenomegaly.  Extremities l: No 

clubbing, +2/+2 peripheral edema, full range of motion, no deformity, no muscle 

atrophy.  Integumentary: No rashes, petechia, suspected lesions.  Lymphatics: No

axilla or cervical lymphadenopathy.  Neurology;   alert awake oriented x2, no 

focal neurologic deficit,


Laboratory Data at Discharge: 














WBC  4.90 thou/uL (4.3-10.9)   12/24/24  05:54    


 


Hgb  9.5 g/dL (12.0-15.0)  L  12/24/24  05:54    


 


Hct  29.2 % (36.0-45.0)  L  12/24/24  05:54    


 


Plt Count  193 thou/uL (152-406)   12/24/24  05:54    


 


PT  16.0 SECONDS (9.4-12.5)  H  12/17/24  14:10    


 


INR  1.44   12/17/24  14:10    


 


Sodium  136 mEq/L (136-145)   12/24/24  05:54    


 


Potassium  3.6 mEq/L (3.5-5.1)   12/24/24  05:54    


 


BUN  21 mg/dL (7-18)  H  12/24/24  05:54    


 


Creatinine  1.15 mg/dL (0.55-1.02)  H  12/24/24  05:54    


 


Glucose  88 mg/dL ()   12/24/24  05:54    


 


Magnesium  2.9 mg/dL (1.6-2.4)  H  12/17/24  14:10    


 


Total Bilirubin  0.5 mg/dL (0.2-1.0)   12/24/24  05:54    


 


AST  26 U/L (15-37)   12/24/24  05:54    


 


ALT  21 U/L (13-56)   12/24/24  05:54    


 


Alkaline Phosphatase  91 U/L ()   12/24/24  05:54    








Home Medications: 








Atorvastatin Calcium [Lipitor] 40 mg PO BEDTIME 04/08/18 


Levothyroxine Sodium [Unithroid] 37.5 mcg PO DAILY 11/23/24 


Memantine HCl 5 mg PO BID 11/23/24 


Albuterol Neb [Proventil 0.083% Neb Soln] 2.5 mg NEB [id number] PRN  amp 12/12/24 


Amlodipine [Norvasc*] 10 mg PO DAILY #30 tab 12/12/24 


Apixaban [Eliquis *] 2.5 mg PO BID #60 tab 12/12/24 


Arformoterol Tartrate [Brovana] 15 mcg NEB BIDRESP  vial.neb 12/12/24 


Bumetanide [Bumex*] 2 mg PO BID #120 tab 12/12/24 


Calcitrol [Rocaltrol*] 0.5 mcg PO DAILY  cap 12/12/24 


Hydrocortisone Cream [Hydrocortisone 1% Cream*] 1 appl TOP TID PRN  tube 

12/12/24 


Isosorbide Dinit [Isordil*] 20 mg PO BID  tab 12/12/24 


Hydralazine [Apresoline*] 50 mg PO TID #90 tab 12/20/24 





New Medications: 


Hydralazine [Apresoline*] 50 mg PO TID #90 tab


Physician Discharge Instructions: 








-DC IV and DC home


-Follow-up with PCP in 1 to 2 weeks


-Follow-up with Cardiology in 1 to 2 weeks


-Follow-up with Nephrology in 2 weeks.


-Please call Dr. Orta at [phone] if any questions regarding hospital stay


-Please call nursing station at [phone] if any nursing or medication 

questions


-Return to the emergency room if symptoms worsen
































****Patient's tele was reviewed per Cardiology, Dr. Delgado, and "she is in sinus

 rhythm with no significant pauses, chart mention history of atrial 

fibrillation, but family says they were not told about it before and she is on 

Eliquis because she had a pain pump catheter and they were worried about 

clotting with it and thats why she was palced on eliquis and it was even reduced

 recently to once a day pill. Patient usually follows up with cardiologist in 

[place] and had a recent 7 days event monitor but they did not follow up on 

results." We have held amiodarone and increased patient's hydralazine. Patient 

needs to follow up with her cardiologist (in [place]) as an outpatient.








Diet: Renal


Followup: 


Moses Nicholson MD [Primary Care Provider] - 


Time spent managing pt's care (in minutes): 35

## 2024-12-31 NOTE — P.PN
Subjective


Date of Service: 12/23/24





Patient admitted to the hospital for bradycardia arrhythmia.  Patient has been 

on antiarrhythmics for A-fib.  This has been held.  Patient is clinically doing 

much better.  Patient's heart rate is controlled.  Patient was diuresed and 

patient keeping legs elevated so patient with significant diuresing.  At this 

time, patient is doing well and patient is stable for discharge back to [facility].





Review of Systems


10-point ROS is otherwise unremarkable





Physical Examination





- Vital Signs


Temperature: 97.7 F


Blood Pressure: 141/66


Pulse: 74


Respirations: 20


Pulse Ox (%): 93





- Physical Exam


General: Alert, In no apparent distress, Oriented x2


HEENT: Atraumatic, PERRLA, EOMI


Neck: Supple, JVD not distended


Respiratory: Diminished, Crackles/rales


Cardiovascular: Regular rate/rhythm, Normal S1 S2, Systolic murmur


Gastrointestinal: Normal bowel sounds, Soft and benign, Non-distended, No 

tenderness


Musculoskeletal: No clubbing, No tenderness, Swelling


Integumentary: No rashes, Tenderness/swelling


Neurological: Sensation intact, Cranial nerves 3-12 intact


Other Physical/Emotional Findings: - Physical Exam.  General: Obese, chronic 

ill-looking, in no apparent distress,.  HEENT: Normocephalic, atraumatic, 

nonicteric sclera, nonanemic conjunctive.  Neck: Supple,  without JVD or goiter 

or thyroid mass.  Respiratory: Normal breathing effort, clear to auscultation 

bilaterally, no crackles no wheezing or rhonchi, tunneled dialysis catheter in 

the right upper chest.  Cardiovascular: Regular rate and rhythm, S1, S2 normal, 

no murmur no gallop.  Gastrointestinal: Normal bowel sounds, nondistended, 

nontender, No ascites, , No masses, no hepatosplenomegaly.  Extremities l: No 

clubbing, +2/+2 peripheral edema, full range of motion, no deformity, no muscle 

atrophy.  Integumentary: No rashes, petechia, suspected lesions.  Lymphatics: No

axilla or cervical lymphadenopathy.  Neurology;   alert awake oriented x2, no 

focal neurologic deficit,





- Studies


Medications List Reviewed: Yes





Assessment & Plan





- Problems (Diagnosis)


(1) Venous insufficiency


Status: Acute   





(2) Bilateral lower extremity edema


Status: Acute   





(3) Acute on chronic diastolic heart failure


Status: Acute   





(4) Bradycardia


Status: Acute   





(5) GERD (gastroesophageal reflux disease)


Onset Date: 05/21/18   Status: Acute   





(6) Hypothyroid


Status: Acute   





(7) Obesity


Onset Date: 05/21/18   Status: Acute   





(8) Paroxysmal atrial fibrillation


Onset Date: 05/21/18   Status: Acute   





(9) Hyperlipidemia


Onset Date: 05/21/18   Status: Chronic   


Qualifiers: 


 





(10) Hypertension


Status: Chronic   


Qualifiers: 


   Hypertension type: essential hypertension 





(11) Hypothyroidism


Onset Date: 05/21/18   Status: Chronic   


Qualifiers: 


   Hypothyroidism type: unspecified 





(12) Vascular dementia


Status: Chronic   





(13) Obstructive sleep apnea


Status: Suspected   





- Plan





Plan:


1.  Continue with medication for diuresing.  Continue holding medication for A-

fib as patient's rate is stable.  Continue anticoagulation


2.  Working on discharge planning; physical therapy evaluation


3.  GI DVT prophylaxis


Discharge Plan: Nursing Home


Plan to discharge in: Greater than 2 days





- Advance Directives


Does patient have a Living Will: Yes


Does patient have a Durable POA for Healthcare: Yes





- Code Status/Comfort Care


Code Status Assessed: Yes


Code Status: Full Code


Critical Care: No


Time Spent Managing PTS Care (In Minutes): 35